# Patient Record
Sex: FEMALE | Race: WHITE | NOT HISPANIC OR LATINO | Employment: FULL TIME | URBAN - METROPOLITAN AREA
[De-identification: names, ages, dates, MRNs, and addresses within clinical notes are randomized per-mention and may not be internally consistent; named-entity substitution may affect disease eponyms.]

---

## 2017-03-01 ENCOUNTER — OFFICE VISIT (OUTPATIENT)
Dept: INTERNAL MEDICINE | Facility: CLINIC | Age: 22
End: 2017-03-01
Payer: COMMERCIAL

## 2017-03-01 VITALS
DIASTOLIC BLOOD PRESSURE: 62 MMHG | BODY MASS INDEX: 16.82 KG/M2 | HEART RATE: 82 BPM | OXYGEN SATURATION: 100 % | SYSTOLIC BLOOD PRESSURE: 124 MMHG | TEMPERATURE: 97.6 F | HEIGHT: 68 IN | WEIGHT: 111 LBS

## 2017-03-01 DIAGNOSIS — Z00.00 HEALTH CARE MAINTENANCE: Primary | ICD-10-CM

## 2017-03-01 DIAGNOSIS — Z23 NEED FOR TDAP VACCINATION: ICD-10-CM

## 2017-03-01 LAB
ERYTHROCYTE [DISTWIDTH] IN BLOOD BY AUTOMATED COUNT: 12.5 % (ref 10–15)
HCT VFR BLD AUTO: 39.6 % (ref 35–47)
HGB BLD-MCNC: 13.1 G/DL (ref 11.7–15.7)
MCH RBC QN AUTO: 29.4 PG (ref 26.5–33)
MCHC RBC AUTO-ENTMCNC: 33.1 G/DL (ref 31.5–36.5)
MCV RBC AUTO: 89 FL (ref 78–100)
PLATELET # BLD AUTO: 203 10E9/L (ref 150–450)
RBC # BLD AUTO: 4.45 10E12/L (ref 3.8–5.2)
WBC # BLD AUTO: 6.8 10E9/L (ref 4–11)

## 2017-03-01 PROCEDURE — 80048 BASIC METABOLIC PNL TOTAL CA: CPT | Performed by: NURSE PRACTITIONER

## 2017-03-01 PROCEDURE — 84443 ASSAY THYROID STIM HORMONE: CPT | Performed by: NURSE PRACTITIONER

## 2017-03-01 PROCEDURE — 87491 CHLMYD TRACH DNA AMP PROBE: CPT | Performed by: NURSE PRACTITIONER

## 2017-03-01 PROCEDURE — 90715 TDAP VACCINE 7 YRS/> IM: CPT | Performed by: NURSE PRACTITIONER

## 2017-03-01 PROCEDURE — 80061 LIPID PANEL: CPT | Performed by: NURSE PRACTITIONER

## 2017-03-01 PROCEDURE — 85027 COMPLETE CBC AUTOMATED: CPT | Performed by: NURSE PRACTITIONER

## 2017-03-01 PROCEDURE — 99385 PREV VISIT NEW AGE 18-39: CPT | Mod: 25 | Performed by: NURSE PRACTITIONER

## 2017-03-01 PROCEDURE — 87591 N.GONORRHOEAE DNA AMP PROB: CPT | Performed by: NURSE PRACTITIONER

## 2017-03-01 PROCEDURE — 90471 IMMUNIZATION ADMIN: CPT | Performed by: NURSE PRACTITIONER

## 2017-03-01 PROCEDURE — 36415 COLL VENOUS BLD VENIPUNCTURE: CPT | Performed by: NURSE PRACTITIONER

## 2017-03-01 NOTE — LETTER
[facility]  [address], [address]  [place], [place]  [address]                                            TEL:[phone]  FAX:[phone]      Julia Palencia  [address] [facility] [address]      March 2, 2017    To whom it may concern,    Ms. Julia Palencia presented on 3/1/17 for physical exam and medical clearance to participate in contact/tackle football. Her medical history is unremarkable with the exception of a concussion in 2006 which required overnight hospitalization.  There has not been any ongoing medical concerns.  Physical exam and lab work are normal.  There are no physical limitations for Julia.  Please contact me with any questions or concerns.      Sincerely,      Cori Hayes C.N.P.

## 2017-03-01 NOTE — NURSING NOTE
"Chief Complaint   Patient presents with     Physical     non fasting       Initial /62 (BP Location: Right arm, Patient Position: Chair, Cuff Size: Adult Regular)  Pulse 82  Temp 97.6  F (36.4  C) (Oral)  Ht 5' 7.5\" (1.715 m)  Wt 111 lb (50.3 kg)  LMP 02/12/2017 (Approximate)  SpO2 100%  Breastfeeding? No  BMI 17.13 kg/m2 Estimated body mass index is 17.13 kg/(m^2) as calculated from the following:    Height as of this encounter: 5' 7.5\" (1.715 m).    Weight as of this encounter: 111 lb (50.3 kg).  Medication Reconciliation: complete    "

## 2017-03-01 NOTE — PROGRESS NOTES
SUBJECTIVE:     CC: Julia Palencia is an 21 year old woman who presents for preventive health visit.     Healthy Habits:    Do you get at least three servings of calcium containing foods daily (dairy, green leafy vegetables, etc.)? yes    Amount of exercise or daily activities, outside of work: 3 day(s) per week    Problems taking medications regularly not applicable    Medication side effects: No    Have you had an eye exam in the past two years? Yes x 1 year ago    Do you see a dentist twice per year? Yes x 2 months ago    Do you have sleep apnea, excessive snoring or daytime drowsiness?no  See care everywhere          Today's PHQ-2 Score: No flowsheet data found.    Abuse: Current or Past(Physical, Sexual or Emotional)- No  Do you feel safe in your environment - Yes    Social History   Substance Use Topics     Smoking status: Not on file     Smokeless tobacco: Not on file     Alcohol use Not on file     The patient does not drink >3 drinks per day nor >7 drinks per week.    No results for input(s): CHOL, HDL, LDL, TRIG, CHOLHDLRATIO, NHDL in the last 54003 hours.    Reviewed orders with patient.  Reviewed health maintenance and updated orders accordingly - Yes    Mammo Decision Support:  Mammogram not appropriate for this patient based on age.    Pertinent mammograms are reviewed under the imaging tab.  History of abnormal Pap smear: NO - age 21-29 PAP every 3 years recommended    Reviewed and updated as needed this visit by clinical staff         Reviewed and updated as needed this visit by Provider            ROS:  C: NEGATIVE for fever, chills, change in weight  I: NEGATIVE for worrisome rashes, moles or lesions  E: NEGATIVE for vision changes or irritation  ENT: NEGATIVE for ear, mouth and throat problems  R: NEGATIVE for significant cough or SOB  B: NEGATIVE for masses, tenderness or discharge  CV: NEGATIVE for chest pain, palpitations or peripheral edema  GI: NEGATIVE for nausea, abdominal pain,  heartburn, or change in bowel habits  : NEGATIVE for unusual urinary or vaginal symptoms. Periods are regular.  M: NEGATIVE for significant arthralgias or myalgia  N: NEGATIVE for weakness, dizziness or paresthesias  P: NEGATIVE for changes in mood or affect      OBJECTIVE:     There were no vitals taken for this visit.  EXAM:  GENERAL: healthy, alert and no distress    ASSESSMENT/PLAN:         ICD-10-CM    1. Health care maintenance Z00.00 CBC with platelets     Basic metabolic panel     TSH with free T4 reflex     Lipid panel reflex to direct LDL     Chlamydia trachomatis PCR     Neisseria gonorrhoeae PCR   2. Need for Tdap vaccination Z23 TDAP (ADACEL AGES 11-64)       COUNSELING:   Reviewed preventive health counseling, as reflected in patient instructions         has no tobacco history on file.    There is no height or weight on file to calculate BMI.       Counseling Resources:  ATP IV Guidelines  Pooled Cohorts Equation Calculator  Breast Cancer Risk Calculator  FRAX Risk Assessment  ICSI Preventive Guidelines  Dietary Guidelines for Americans, 2010  USDA's MyPlate  ASA Prophylaxis  Lung CA Screening    Cori Hayes NP  [facility]

## 2017-03-01 NOTE — MR AVS SNAPSHOT
After Visit Summary   3/1/2017    Julia Palencia    MRN: [id number]           Patient Information     Date Of Birth          1995        Visit Information        Provider Department      3/1/2017 4:00 PM Cori Hayes NP [facility]        Today's Diagnoses     Health care maintenance    -  1    Need for Tdap vaccination          Care Instructions      Preventive Health Recommendations  Female Ages 18 to 25     Yearly exam:     See your health care provider every year in order to  o Review health changes.   o Discuss preventive care.    o Review your medicines if your doctor has prescribed any.      You should be tested each year for STDs (sexually transmitted diseases).       After age 20, talk to your provider about how often you should have cholesterol testing.      Starting at age 21, get a Pap test every three years. If you have an abnormal result, your doctor may have you test more often.      If you are at risk for diabetes, you should have a diabetes test (fasting glucose).     Shots:     Get a flu shot each year.     Get a tetanus shot every 10 years.     Consider getting the shot (vaccine) that prevents cervical cancer (Gardasil).    Nutrition:     Eat at least 5 servings of fruits and vegetables each day.    Eat whole-grain bread, whole-wheat pasta and brown rice instead of white grains and rice.    Talk to your provider about Calcium and Vitamin D.     Lifestyle    Exercise at least 150 minutes a week each week (30 minutes a day, 5 days a week). This will help you control your weight and prevent disease.    Limit alcohol to one drink per day.    No smoking.     Wear sunscreen to prevent skin cancer.    See your dentist every six months for an exam and cleaning.          Follow-ups after your visit        Who to contact     If you have questions or need follow up information about today's clinic visit or your schedule please contact [facility]  "directly at [phone].  Normal or non-critical lab and imaging results will be communicated to you by MyChart, letter or phone within 4 business days after the clinic has received the results. If you do not hear from us within 7 days, please contact the clinic through [employer] or phone. If you have a critical or abnormal lab result, we will notify you by phone as soon as possible.  Submit refill requests through [employer] or call your pharmacy and they will forward the refill request to us. Please allow 3 business days for your refill to be completed.          Additional Information About Your Visit        Webmedxhar[employer]ktone Information     [employer] lets you send messages to your doctor, view your test results, renew your prescriptions, schedule appointments and more. To sign up, go to [email or web address] . Click on \"Log in\" on the left side of the screen, which will take you to the Welcome page. Then click on \"Sign up Now\" on the right side of the page.     You will be asked to enter the access code listed below, as well as some personal information. Please follow the directions to create your username and password.     Your access code is: [email or web address]  Expires: 2017  1:46 PM     Your access code will  in 90 days. If you need help or a new code, please call your [place] clinic or [phone].        Care EveryWhere ID     This is your Care EveryWhere ID. This could be used by other organizations to access your [place] medical records  [id number]        Your Vitals Were     Pulse Temperature Height Last Period Pulse Oximetry Breastfeeding?    82 97.6  F (36.4  C) (Oral) 5' 7.5\" (1.715 m) 2017 (Approximate) 100% No    BMI (Body Mass Index)                   17.13 kg/m2            Blood Pressure from Last 3 Encounters:   17 124/62    Weight from Last 3 Encounters:   17 111 lb (50.3 kg)              We Performed the Following     Basic metabolic panel     CBC with platelets     Chlamydia " trachomatis PCR     Lipid panel reflex to direct LDL     Neisseria gonorrhoeae PCR     TDAP (ADACEL AGES 11-64)     TSH with free T4 reflex        Primary Care Provider Office Phone # Fax #    Cori Corona Freddy, DHRUV [phone]       [facility] [address] [facility] [address]        Equal Access to Services     RAY BANKS : Hadii aad ku hadasho Soomaali, waaxda luqadaha, qaybta kaalmada adeegyada, waxay idiin hayaan adeeg kharash la'aan . So [facility] [phone].    ATENCIÓN: Si habla español, tiene a lacy disposición servicios gratuitos de asistencia lingüística. Maria G al [phone].    We comply with applicable federal civil rights laws and [place] laws. We do not discriminate on the basis of race, color, national origin, age, disability sex, sexual orientation or gender identity.            Thank you!     Thank you for choosing [facility]  for your care. Our goal is always to provide you with excellent care. Hearing back from our patients is one way we can continue to improve our services. Please take a few minutes to complete the written survey that you may receive in the mail after your visit with us. Thank you!             Your Updated Medication List - Protect others around you: Learn how to safely use, store and throw away your medicines at www.disposemymeds.org.      Notice  As of 3/1/2017 11:59 PM    You have not been prescribed any medications.

## 2017-03-02 LAB
ANION GAP SERPL CALCULATED.3IONS-SCNC: 7 MMOL/L (ref 3–14)
BUN SERPL-MCNC: 11 MG/DL (ref 7–30)
CALCIUM SERPL-MCNC: 9 MG/DL (ref 8.5–10.1)
CHLORIDE SERPL-SCNC: 105 MMOL/L (ref 94–109)
CHOLEST SERPL-MCNC: 165 MG/DL
CO2 SERPL-SCNC: 27 MMOL/L (ref 20–32)
CREAT SERPL-MCNC: 0.67 MG/DL (ref 0.52–1.04)
GFR SERPL CREATININE-BSD FRML MDRD: NORMAL ML/MIN/1.7M2
GLUCOSE SERPL-MCNC: 90 MG/DL (ref 70–99)
HDLC SERPL-MCNC: 60 MG/DL
LDLC SERPL CALC-MCNC: 89 MG/DL
NONHDLC SERPL-MCNC: 105 MG/DL
POTASSIUM SERPL-SCNC: 4.4 MMOL/L (ref 3.4–5.3)
SODIUM SERPL-SCNC: 139 MMOL/L (ref 133–144)
TRIGL SERPL-MCNC: 82 MG/DL
TSH SERPL DL<=0.005 MIU/L-ACNC: 2.68 MU/L (ref 0.4–4)

## 2017-03-03 LAB
C TRACH DNA SPEC QL NAA+PROBE: NORMAL
N GONORRHOEA DNA SPEC QL NAA+PROBE: NORMAL
SPECIMEN SOURCE: NORMAL
SPECIMEN SOURCE: NORMAL

## 2017-06-24 ENCOUNTER — HEALTH MAINTENANCE LETTER (OUTPATIENT)
Age: 22
End: 2017-06-24

## 2017-12-19 ENCOUNTER — OFFICE VISIT (OUTPATIENT)
Dept: BEHAVIORAL HEALTH | Facility: CLINIC | Age: 22
End: 2017-12-19
Payer: COMMERCIAL

## 2017-12-19 ENCOUNTER — OFFICE VISIT (OUTPATIENT)
Dept: INTERNAL MEDICINE | Facility: CLINIC | Age: 22
End: 2017-12-19
Payer: COMMERCIAL

## 2017-12-19 VITALS
DIASTOLIC BLOOD PRESSURE: 90 MMHG | WEIGHT: 120.7 LBS | SYSTOLIC BLOOD PRESSURE: 118 MMHG | TEMPERATURE: 97.8 F | HEIGHT: 68 IN | BODY MASS INDEX: 18.29 KG/M2 | HEART RATE: 80 BPM | OXYGEN SATURATION: 100 %

## 2017-12-19 DIAGNOSIS — F32.1 MAJOR DEPRESSIVE DISORDER, SINGLE EPISODE, MODERATE (H): Primary | ICD-10-CM

## 2017-12-19 DIAGNOSIS — F41.1 GAD (GENERALIZED ANXIETY DISORDER): Primary | ICD-10-CM

## 2017-12-19 PROCEDURE — 99213 OFFICE O/P EST LOW 20 MIN: CPT | Performed by: NURSE PRACTITIONER

## 2017-12-19 PROCEDURE — 99207 ZZC NO CHARGE BEHAVIORAL WARM HANDOFF: CPT | Performed by: MARRIAGE & FAMILY THERAPIST

## 2017-12-19 ASSESSMENT — ANXIETY QUESTIONNAIRES
GAD7 TOTAL SCORE: 15
5. BEING SO RESTLESS THAT IT IS HARD TO SIT STILL: MORE THAN HALF THE DAYS
6. BECOMING EASILY ANNOYED OR IRRITABLE: SEVERAL DAYS
3. WORRYING TOO MUCH ABOUT DIFFERENT THINGS: NEARLY EVERY DAY
2. NOT BEING ABLE TO STOP OR CONTROL WORRYING: NEARLY EVERY DAY
7. FEELING AFRAID AS IF SOMETHING AWFUL MIGHT HAPPEN: SEVERAL DAYS
1. FEELING NERVOUS, ANXIOUS, OR ON EDGE: NEARLY EVERY DAY
IF YOU CHECKED OFF ANY PROBLEMS ON THIS QUESTIONNAIRE, HOW DIFFICULT HAVE THESE PROBLEMS MADE IT FOR YOU TO DO YOUR WORK, TAKE CARE OF THINGS AT HOME, OR GET ALONG WITH OTHER PEOPLE: SOMEWHAT DIFFICULT

## 2017-12-19 ASSESSMENT — PATIENT HEALTH QUESTIONNAIRE - PHQ9
SUM OF ALL RESPONSES TO PHQ QUESTIONS 1-9: 21
5. POOR APPETITE OR OVEREATING: MORE THAN HALF THE DAYS

## 2017-12-19 NOTE — MR AVS SNAPSHOT
"              After Visit Summary   12/19/2017    Julia Palencia    MRN: [id number]           Patient Information     Date Of Birth          1995        Visit Information        Provider Department      12/19/2017 9:40 AM Cori Hayes NP [facility] [address] [facility]        Today's Diagnoses     Major depressive disorder, single episode, moderate (H)    -  1       Follow-ups after your visit        Your next 10 appointments already scheduled     Dec 19, 2017 11:00 AM CST   New Visit with JOAN Chen   [facility] [address] ([facility])    [address] [facility] [address]  [facility] [id number] [phone]              Who to contact     If you have questions or need follow up information about today's clinic visit or your schedule please contact [facility] directly at [phone].  Normal or non-critical lab and imaging results will be communicated to you by MyChart, letter or phone within 4 business days after the clinic has received the results. If you do not hear from us within 7 days, please contact the clinic through MyChart or phone. If you have a critical or abnormal lab result, we will notify you by phone as soon as possible.  Submit refill requests through [employer] or call your pharmacy and they will forward the refill request to us. Please allow 3 business days for your refill to be completed.          Additional Information About Your Visit        MyChart Information     [employer] lets you send messages to your doctor, view your test results, renew your prescriptions, schedule appointments and more. To sign up, go to [email or web address] . Click on \"Log in\" on the left side of the screen, which will take you to the Welcome page. Then click on \"Sign up Now\" on the right side of the page.     You will be asked to enter the access code listed below, as well as some personal information. Please follow the directions to create your username and " "password.     Your access code is: [email or web address]  Expires: 3/19/2018 10:55 AM     Your access code will  in 90 days. If you need help or a new code, please call your [facility] or [phone].        Care EveryWhere ID     This is your Care EveryWhere ID. This could be used by other organizations to access your [place] medical records  [id number]        Your Vitals Were     Pulse Temperature Height Last Period Pulse Oximetry BMI (Body Mass Index)    80 97.8  F (36.6  C) (Oral) 5' 7.5\" (1.715 m) 2017 100% 18.63 kg/m2       Blood Pressure from Last 3 Encounters:   17 118/90   17 124/62    Weight from Last 3 Encounters:   17 120 lb 11.2 oz (54.7 kg)   17 111 lb (50.3 kg)              Today, you had the following     No orders found for display         Today's Medication Changes          These changes are accurate as of: 17 10:56 AM.  If you have any questions, ask your nurse or doctor.               Start taking these medicines.        Dose/Directions    sertraline 50 MG tablet   Commonly known as:  ZOLOFT   Used for:  Major depressive disorder, single episode, moderate (H)   Started by:  Cori Hayes NP        Dose:  50 mg   Take 1 tablet (50 mg) by mouth daily   Quantity:  30 tablet   Refills:  1            Where to get your medicines      These medications were sent to [place] [address] [place] [address] [address] [address]     Phone:  [phone]     sertraline 50 MG tablet                Primary Care Provider Office Phone # Fax #    Cori Hayes [phone]       [address] [facility] [address]        Equal Access to Services     RAY BANKS : Shabbir napoleso Solakisha, waaxda luqadaha, qaybta kaalmada adeegyada, zander laird. So [facility] [phone].    ATENCIÓN: Si habla español, tiene a lacy disposición servicios gratuitos de asistencia lingüística. " Maria G fisher [phone].    We comply with applicable federal civil rights laws and [place] laws. We do not discriminate on the basis of race, color, national origin, age, disability, sex, sexual orientation, or gender identity.            Thank you!     Thank you for choosing [facility]  for your care. Our goal is always to provide you with excellent care. Hearing back from our patients is one way we can continue to improve our services. Please take a few minutes to complete the written survey that you may receive in the mail after your visit with us. Thank you!             Your Updated Medication List - Protect others around you: Learn how to safely use, store and throw away your medicines at www.disposemymeds.org.          This list is accurate as of: 12/19/17 10:56 AM.  Always use your most recent med list.                   Brand Name Dispense Instructions for use Diagnosis    sertraline 50 MG tablet    ZOLOFT    30 tablet    Take 1 tablet (50 mg) by mouth daily    Major depressive disorder, single episode, moderate (H)

## 2017-12-19 NOTE — PROGRESS NOTES
Patient had appointment with his/her primary care physician. [facility] services were requested / offered. No immediate safety/risk issues were reported or identified.  Explained the role of the [facility] and provided informational handout and contact information for the [facility].     Chelsea Perkins, Behavioral Health Clinician  Declined Service at this time. Denies having SI and or plan.

## 2017-12-19 NOTE — PROGRESS NOTES
"  SUBJECTIVE:   Julia Palencia is a 22 year old female who presents to clinic today for the following health issues:      Abnormal Mood Symptoms      Duration: 2-3 months, worse past few weeks    Description:  Depression: no  Anxiety: no  Panic attacks: no     Accompanying signs and symptoms: see PHQ-9 and ADRI scores    History (similar episodes/previous evaluation): None    Precipitating or alleviating factors: broke off engagement 4 months ago.    Therapies tried and outcome: none          There is no problem list on file for this patient.    History reviewed. No pertinent surgical history.    Social History   Substance Use Topics     Smoking status: Never Smoker     Smokeless tobacco: Never Used     Alcohol use Yes      Comment: 1-2 per week     Family History   Problem Relation Age of Onset     DIABETES Mother      GASTROINTESTINAL DISEASE Father      Depression Brother          Current Outpatient Prescriptions   Medication Sig Dispense Refill     sertraline (ZOLOFT) 50 MG tablet Take 1 tablet (50 mg) by mouth daily 30 tablet 1     BP Readings from Last 3 Encounters:   12/19/17 118/90   03/01/17 124/62    Wt Readings from Last 3 Encounters:   12/19/17 120 lb 11.2 oz (54.7 kg)   03/01/17 111 lb (50.3 kg)                        Reviewed and updated as needed this visit by clinical staffTobacco  Allergies  Meds  Problems  Med Hx  Surg Hx  Fam Hx  Soc Hx        Reviewed and updated as needed this visit by Provider         ROS:  Constitutional, HEENT, cardiovascular, pulmonary, gi and gu systems are negative, except as otherwise noted.      OBJECTIVE:   /90 (BP Location: Right arm, Patient Position: Sitting, Cuff Size: Adult Regular)  Pulse 80  Temp 97.8  F (36.6  C) (Oral)  Ht 5' 7.5\" (1.715 m)  Wt 120 lb 11.2 oz (54.7 kg)  [facility] 12/18/2017  SpO2 100%  BMI 18.63 kg/m2  Body mass index is 18.63 kg/(m^2).  GENERAL: slender female, old excoriation scarring on forearms  PSYCH: mentation appears normal, " affect flat, anxious, appearance disheveled, fidgety, unable to sit still, constant hand or leg movement        ASSESSMENT/PLAN:               ICD-10-CM    1. Major depressive disorder, single episode, moderate (H) F32.1 sertraline (ZOLOFT) 50 MG tablet       F/u 2-3 weeks  Hand off to Chelsea TROY for counseling    A total of 15 minutes was spent with the patient today, with greater than 50% of the visit involving counseling and coordination of care.      Cori Hayes NP  [facility]

## 2017-12-19 NOTE — NURSING NOTE
"Chief Complaint   Patient presents with     Depression     Contraception       Initial /90 (BP Location: Right arm, Patient Position: Sitting, Cuff Size: Adult Regular)  Pulse 80  Temp 97.8  F (36.6  C) (Oral)  Ht 5' 7.5\" (1.715 m)  Wt 120 lb 11.2 oz (54.7 kg)  LMP 12/18/2017  SpO2 100%  BMI 18.63 kg/m2 Estimated body mass index is 18.63 kg/(m^2) as calculated from the following:    Height as of this encounter: 5' 7.5\" (1.715 m).    Weight as of this encounter: 120 lb 11.2 oz (54.7 kg).  Medication Reconciliation: complete    "

## 2017-12-19 NOTE — MR AVS SNAPSHOT
"              After Visit Summary   12/19/2017    Julia Palencia    MRN: [id number]           Patient Information     Date Of Birth          1995        Visit Information        Provider Department      12/19/2017 11:00 AM Chelsea Perkins LMFT [facility]        Today's Diagnoses     ADRI (generalized anxiety disorder)    -  1       Follow-ups after your visit        Your next 10 appointments already scheduled     Dec 19, 2017 11:00 AM CST   New Visit with JOAN Chen   [facility] ([facility]    [address] [facility] [address]  [facility] [id number] [phone]              Who to contact     If you have questions or need follow up information about today's clinic visit or your schedule please contact [facility] directly at [phone].  Normal or non-critical lab and imaging results will be communicated to you by MyChart, letter or phone within 4 business days after the clinic has received the results. If you do not hear from us within 7 days, please contact the clinic through MyChart or phone. If you have a critical or abnormal lab result, we will notify you by phone as soon as possible.  Submit refill requests through [employer] or call your pharmacy and they will forward the refill request to us. Please allow 3 business days for your refill to be completed.          Additional Information About Your Visit        MyChart Information     [employer] lets you send messages to your doctor, view your test results, renew your prescriptions, schedule appointments and more. To sign up, go to [email or web address] . Click on \"Log in\" on the left side of the screen, which will take you to the Welcome page. Then click on \"Sign up Now\" on the right side of the page.     You will be asked to enter the access code listed below, as well as some personal information. Please follow the directions to create your username and password.     Your " access code is: [email or web address]  Expires: 3/19/2018 10:55 AM     Your access code will  in 90 days. If you need help or a new code, please call your [place] clinic or [phone].        Care EveryWhere ID     This is your Care EveryWhere ID. This could be used by other organizations to access your [place] medical records  [id number]        Your Vitals Were     Last Period                   2017            Blood Pressure from Last 3 Encounters:   17 118/90   17 124/62    Weight from Last 3 Encounters:   17 54.7 kg (120 lb 11.2 oz)   17 50.3 kg (111 lb)              Today, you had the following     No orders found for display         Today's Medication Changes          These changes are accurate as of: 17 10:55 AM.  If you have any questions, ask your nurse or doctor.               Start taking these medicines.        Dose/Directions    sertraline 50 MG tablet   Commonly known as:  ZOLOFT   Used for:  Major depressive disorder, single episode, moderate (H)   Started by:  Cori Hayes NP        Dose:  50 mg   Take 1 tablet (50 mg) by mouth daily   Quantity:  30 tablet   Refills:  1            Where to get your medicines      These medications were sent to [place] [address] [address] [id number]     Phone:  [phone]     sertraline 50 MG tablet                Primary Care Provider Office Phone # Fax #    Cori Hayes [phone]       [facility] [address] [place] [address]        Equal Access to Services     [facility]: Hadii aad ku hadasho Soomaali, waaxda luqadaha, qaybta kaalmada adeegyada, zander laird. So [facility] [phone].    ATENCIÓN: Si habla español, tiene a lacy disposición servicios gratuitos de asistencia lingüística. Llame al [phone].    We comply with applicable federal civil rights laws and [place] laws. We do not discriminate on the  basis of race, color, national origin, age, disability, sex, sexual orientation, or gender identity.            Thank you!     Thank you for choosing [facility]  for your care. Our goal is always to provide you with excellent care. Hearing back from our patients is one way we can continue to improve our services. Please take a few minutes to complete the written survey that you may receive in the mail after your visit with us. Thank you!             Your Updated Medication List - Protect others around you: Learn how to safely use, store and throw away your medicines at www.disposemymeds.org.          This list is accurate as of: 12/19/17 10:55 AM.  Always use your most recent med list.                   Brand Name Dispense Instructions for use Diagnosis    sertraline 50 MG tablet    ZOLOFT    30 tablet    Take 1 tablet (50 mg) by mouth daily    Major depressive disorder, single episode, moderate (H)

## 2017-12-20 ASSESSMENT — ANXIETY QUESTIONNAIRES: GAD7 TOTAL SCORE: 15

## 2018-01-17 ENCOUNTER — OFFICE VISIT (OUTPATIENT)
Dept: INTERNAL MEDICINE | Facility: CLINIC | Age: 23
End: 2018-01-17
Payer: COMMERCIAL

## 2018-01-17 VITALS
HEART RATE: 120 BPM | HEIGHT: 68 IN | WEIGHT: 115.4 LBS | DIASTOLIC BLOOD PRESSURE: 70 MMHG | BODY MASS INDEX: 17.49 KG/M2 | TEMPERATURE: 97.7 F | SYSTOLIC BLOOD PRESSURE: 110 MMHG | RESPIRATION RATE: 16 BRPM | OXYGEN SATURATION: 99 %

## 2018-01-17 DIAGNOSIS — F32.0 MILD SINGLE CURRENT EPISODE OF MAJOR DEPRESSIVE DISORDER (H): ICD-10-CM

## 2018-01-17 DIAGNOSIS — F41.1 GAD (GENERALIZED ANXIETY DISORDER): Primary | ICD-10-CM

## 2018-01-17 DIAGNOSIS — Z23 NEED FOR PROPHYLACTIC VACCINATION AND INOCULATION AGAINST INFLUENZA: ICD-10-CM

## 2018-01-17 PROCEDURE — 99213 OFFICE O/P EST LOW 20 MIN: CPT | Performed by: NURSE PRACTITIONER

## 2018-01-17 PROCEDURE — 90471 IMMUNIZATION ADMIN: CPT | Performed by: NURSE PRACTITIONER

## 2018-01-17 PROCEDURE — 90686 IIV4 VACC NO PRSV 0.5 ML IM: CPT | Performed by: NURSE PRACTITIONER

## 2018-01-17 RX ORDER — SERTRALINE HYDROCHLORIDE 100 MG/1
100 TABLET, FILM COATED ORAL DAILY
Qty: 90 TABLET | Refills: 1 | Status: SHIPPED | OUTPATIENT
Start: 2018-01-17 | End: 2018-06-26

## 2018-01-17 ASSESSMENT — ANXIETY QUESTIONNAIRES
7. FEELING AFRAID AS IF SOMETHING AWFUL MIGHT HAPPEN: NOT AT ALL
2. NOT BEING ABLE TO STOP OR CONTROL WORRYING: MORE THAN HALF THE DAYS
5. BEING SO RESTLESS THAT IT IS HARD TO SIT STILL: SEVERAL DAYS
3. WORRYING TOO MUCH ABOUT DIFFERENT THINGS: MORE THAN HALF THE DAYS
GAD7 TOTAL SCORE: 10
6. BECOMING EASILY ANNOYED OR IRRITABLE: NOT AT ALL
IF YOU CHECKED OFF ANY PROBLEMS ON THIS QUESTIONNAIRE, HOW DIFFICULT HAVE THESE PROBLEMS MADE IT FOR YOU TO DO YOUR WORK, TAKE CARE OF THINGS AT HOME, OR GET ALONG WITH OTHER PEOPLE: SOMEWHAT DIFFICULT
1. FEELING NERVOUS, ANXIOUS, OR ON EDGE: NEARLY EVERY DAY

## 2018-01-17 ASSESSMENT — PATIENT HEALTH QUESTIONNAIRE - PHQ9
5. POOR APPETITE OR OVEREATING: MORE THAN HALF THE DAYS
SUM OF ALL RESPONSES TO PHQ QUESTIONS 1-9: 19

## 2018-01-17 NOTE — NURSING NOTE
"Chief Complaint   Patient presents with     Recheck Medication     zoloft       Initial /70 (BP Location: Right arm, Patient Position: Sitting, Cuff Size: Adult Regular)  Pulse 120  Temp 97.7  F (36.5  C) (Oral)  Resp 16  Ht 5' 7.5\" (1.715 m)  Wt 115 lb 6.4 oz (52.3 kg)  LMP 01/08/2018 (Exact Date)  SpO2 99%  BMI 17.81 kg/m2 Estimated body mass index is 17.81 kg/(m^2) as calculated from the following:    Height as of this encounter: 5' 7.5\" (1.715 m).    Weight as of this encounter: 115 lb 6.4 oz (52.3 kg).  Medication Reconciliation: complete    "

## 2018-01-17 NOTE — PROGRESS NOTES
SUBJECTIVE:   Julia Palencia is a 22 year old female who presents to clinic today for the following health issues:      Anxiety Follow-Up    Status since last visit: Worsened , had panic attack and went to [facility] 1/11/18    Other associated symptoms:None    Complicating factors:   Significant life event: No   Current substance abuse: no  Depression symptoms: Yes-    ADRI-7 SCORE 12/19/2017   Total Score 15       GAD7              Amount of exercise or physical activity: 2-3 days/week for an average of greater than 60 minutes    Problems taking medications regularly: No    Medication side effects: none    Diet: regular (no restrictions)    Patient Active Problem List   Diagnosis     ADRI (generalized anxiety disorder)     Mild single current episode of major depressive disorder (H)     History reviewed. No pertinent surgical history.    Social History   Substance Use Topics     Smoking status: Never Smoker     Smokeless tobacco: Never Used     Alcohol use Yes      Comment: 1-2 per week     Family History   Problem Relation Age of Onset     DIABETES Mother      GASTROINTESTINAL DISEASE Father      Depression Brother          Current Outpatient Prescriptions   Medication Sig Dispense Refill     sertraline (ZOLOFT) 100 MG tablet Take 1 tablet (100 mg) by mouth daily 90 tablet 1     [DISCONTINUED] sertraline (ZOLOFT) 50 MG tablet Take 1 tablet (50 mg) by mouth daily 30 tablet 1     BP Readings from Last 3 Encounters:   01/17/18 110/70   12/19/17 118/90   03/01/17 124/62    Wt Readings from Last 3 Encounters:   01/17/18 115 lb 6.4 oz (52.3 kg)   12/19/17 120 lb 11.2 oz (54.7 kg)   03/01/17 111 lb (50.3 kg)                        Reviewed and updated as needed this visit by clinical staffTobacco  Allergies  Meds  Med Hx  Surg Hx  Fam Hx  Soc Hx      Reviewed and updated as needed this visit by Provider         ROS:  Constitutional, HEENT, cardiovascular, pulmonary, gi and gu systems are negative, except as otherwise  "noted.      OBJECTIVE:   /70 (BP Location: Right arm, Patient Position: Sitting, Cuff Size: Adult Regular)  Pulse 120  Temp 97.7  F (36.5  C) (Oral)  Resp 16  Ht 5' 7.5\" (1.715 m)  Wt 115 lb 6.4 oz (52.3 kg)  LMP 01/08/2018 (Exact Date)  SpO2 99%  BMI 17.81 kg/m2  Body mass index is 17.81 kg/(m^2).  GENERAL: healthy, alert and no distress  PSYCH: mentation appears normal, affect flat and anxious        ASSESSMENT/PLAN:               ICD-10-CM    1. ADRI (generalized anxiety disorder) F41.1 sertraline (ZOLOFT) 100 MG tablet   2. Mild single current episode of major depressive disorder (H) F32.0 sertraline (ZOLOFT) 100 MG tablet       Increase zoloft  F/u 1 month  Encouraged counseling with beatrice Tran.    A total of 15 minutes was spent with the patient today, with greater than 50% of the visit involving counseling and coordination of care.      Cori Hayes, DHRUV  [facility]    "

## 2018-01-17 NOTE — MR AVS SNAPSHOT
"              After Visit Summary   1/17/2018    Julia Palencia    MRN: [id number]           Patient Information     Date Of Birth          1995        Visit Information        Provider Department      1/17/2018 7:40 AM Cori Hayes NP [facility]        Today's Diagnoses     ADRI (generalized anxiety disorder)    -  1    Mild single current episode of major depressive disorder (H)           Follow-ups after your visit        Your next 10 appointments already scheduled     Jan 18, 2018  9:00 AM CST   Office Visit with SASHA Martinez CNM   [facility] ([facility]    [address]  [facility] [address]   [phone]           Bring a current list of meds and any records pertaining to this visit. For Physicals, please bring immunization records and any forms needing to be filled out. Please arrive 10 minutes early to complete paperwork.              Who to contact     If you have questions or need follow up information about today's clinic visit or your schedule please contact [facility] directly at [phone].  Normal or non-critical lab and imaging results will be communicated to you by MyChart, letter or phone within 4 business days after the clinic has received the results. If you do not hear from us within 7 days, please contact the clinic through [employer] or phone. If you have a critical or abnormal lab result, we will notify you by phone as soon as possible.  Submit refill requests through [employer] or call your pharmacy and they will forward the refill request to us. Please allow 3 business days for your refill to be completed.          Additional Information About Your Visit        MyChart Information     [employer] lets you send messages to your doctor, view your test results, renew your prescriptions, schedule appointments and more. To sign up, go to [email or web address] . Click on \"Log in\" on the left side " "of the screen, which will take you to the Welcome page. Then click on \"Sign up Now\" on the right side of the page.     You will be asked to enter the access code listed below, as well as some personal information. Please follow the directions to create your username and password.     Your access code is: [email or web address]  Expires: 3/19/2018 10:55 AM     Your access code will  in 90 days. If you need help or a new code, please call your [facility] clinic or [phone].        Care EveryWhere ID     This is your Care EveryWhere ID. This could be used by other organizations to access your [facility] medical records  [id number]        Your Vitals Were     Pulse Temperature Respirations Height Last Period Pulse Oximetry    120 97.7  F (36.5  C) (Oral) 16 5' 7.5\" (1.715 m) 2018 (Exact Date) 99%    BMI (Body Mass Index)                   17.81 kg/m2            Blood Pressure from Last 3 Encounters:   18 110/70   17 118/90   17 124/62    Weight from Last 3 Encounters:   18 115 lb 6.4 oz (52.3 kg)   17 120 lb 11.2 oz (54.7 kg)   17 111 lb (50.3 kg)              Today, you had the following     No orders found for display         Today's Medication Changes          These changes are accurate as of: 18  8:15 AM.  If you have any questions, ask your nurse or doctor.               These medicines have changed or have updated prescriptions.        Dose/Directions    sertraline 100 MG tablet   Commonly known as:  ZOLOFT   This may have changed:    - medication strength  - how much to take   Used for:  ADRI (generalized anxiety disorder), Mild single current episode of major depressive disorder (H)   Changed by:  Cori Hayes NP        Dose:  100 mg   Take 1 tablet (100 mg) by mouth daily   Quantity:  90 tablet   Refills:  1            Where to get your medicines      These medications were sent to [facility]  [place], [place] - [address] AT [facility]  [address] [place] [address] [address]     Phone:  [phone]     sertraline 100 MG tablet                Primary Care Provider Office Phone # Fax #    Cori Corona DHRUV Hayes [phone]       [address]  [facility] [address]        Equal Access to Services     [facility]: Hadii aad ku hadasho Soomaali, waaxda luqadaha, qaybta kaalmada adeegyada, waxay idiin hayaan adeeg kharash la'aan . So [facility] [phone].    ATENCIÓN: Si habla español, tiene a lacy disposición servicios gratuitos de asistencia lingüística. Wilverame al [phone].    We comply with applicable federal civil rights laws and [place] laws. We do not discriminate on the basis of race, color, national origin, age, disability, sex, sexual orientation, or gender identity.            Thank you!     Thank you for choosing [facility]  for your care. Our goal is always to provide you with excellent care. Hearing back from our patients is one way we can continue to improve our services. Please take a few minutes to complete the written survey that you may receive in the mail after your visit with us. Thank you!             Your Updated Medication List - Protect others around you: Learn how to safely use, store and throw away your medicines at www.disposemymeds.org.          This list is accurate as of: 1/17/18  8:15 AM.  Always use your most recent med list.                   Brand Name Dispense Instructions for use Diagnosis    sertraline 100 MG tablet    ZOLOFT    90 tablet    Take 1 tablet (100 mg) by mouth daily    ADRI (generalized anxiety disorder), Mild single current episode of major depressive disorder (H)

## 2018-01-17 NOTE — PROGRESS NOTES

## 2018-01-18 ENCOUNTER — OFFICE VISIT (OUTPATIENT)
Dept: OBGYN | Facility: CLINIC | Age: 23
End: 2018-01-18
Payer: COMMERCIAL

## 2018-01-18 VITALS — BODY MASS INDEX: 18.39 KG/M2 | DIASTOLIC BLOOD PRESSURE: 70 MMHG | SYSTOLIC BLOOD PRESSURE: 98 MMHG | WEIGHT: 119.2 LBS

## 2018-01-18 DIAGNOSIS — Z11.3 ROUTINE SCREENING FOR STI (SEXUALLY TRANSMITTED INFECTION): ICD-10-CM

## 2018-01-18 DIAGNOSIS — Z30.09 BIRTH CONTROL COUNSELING: Primary | ICD-10-CM

## 2018-01-18 PROCEDURE — 87591 N.GONORRHOEAE DNA AMP PROB: CPT | Performed by: ADVANCED PRACTICE MIDWIFE

## 2018-01-18 PROCEDURE — 99203 OFFICE O/P NEW LOW 30 MIN: CPT | Performed by: ADVANCED PRACTICE MIDWIFE

## 2018-01-18 PROCEDURE — 87491 CHLMYD TRACH DNA AMP PROBE: CPT | Performed by: ADVANCED PRACTICE MIDWIFE

## 2018-01-18 NOTE — PROGRESS NOTES
SUBJECTIVE:  Julia Palencia is a 22 year old woman here for contraceptive management.  HPI: Would like to discuss getting Nexplanon.  Has researched other birth control methods and is most interested in Nexplanon.   MENSTRUAL HISTORY  ==================  No obstetric history on file.  Patient's last menstrual period was 01/08/2018 (exact date).    Periods are regular q 28-30 days, 1-2 days HMB, lasting 6 days  Dysmenorrhea none. Cyclic symptoms include  NONE. Additional symptoms include NONE    SEXUAL HISTORY  ==============  Current contraception: condoms  ACHES reviewed and WNL: yes  Number of partners in last year: 4  Pain with intercourse:No  Bleeding with intercourse:No  History of STD/ Vaginal infections:No STD history  Urinary symptoms:no    HISTORIES  =========  Medical, surgical, and family histories as well as medications and allergies reviewed and updated in this encounter.    REVIEW OF SYSTEMS  ==================  C: NEGATIVE for fever, chills  E: NEGATIVE for vision changes   R: NEGATIVE for significant cough or SOB  CV: NEGATIVE for chest pain, palpitations   GI: NEGATIVE for nausea, abdominal pain, heartburn, or change in bowel habits  : NEGATIVE for frequency, dysuria, or hematuria  M: NEGATIVE for significant arthralgias or myalgia  N: NEGATIVE for weakness, dizziness or paresthesias or headache    EXAM  =========  BP 98/70  Wt 119 lb 3.2 oz (54.1 kg)  LMP 01/08/2018 (Exact Date)  BMI 18.39 kg/m2    GENERAL APPEARANCE: healthy, alert and no distress  RESP: lungs clear to auscultation - no rales, rhonchi or wheezes  CV: regular rates and rhythm  PSYCH: mentation appears normal and affect normal/bright  MENTAL STATUS EXAM:  Appearance/Behavior: No apparent distress  Speech: Normal  Mood/Affect: normal affect  Insight: Adequate    WET PREP:Not done  STI: screening accepted     ASSESSMENT/PLAN  ======  (Z30.09) Birth control counseling  (primary encounter diagnosis)  Plan: Reviewed risks and  benefits.  Patient is a good candidate for Nexplanon.  Has had intercourse in last 2 weeks.  Encouraged to return to clinic during first 7 days of next menses, if not refrain from intercourse 2 weeks prior to procedure. Patient verbalized understanding.   -Nexplanon handout given     (Z11.3) Routine screening for STI (sexually transmitted infection)  Comment: done  Plan: Chlamydia trachomatis PCR, Neisseria         gonorrhoeae PCR        Results pending     -Due for pap, patient deferred this visit.  Would like to return to clinic for Pap.       PATIENT EDUCATION  =================  1.  Discussed with patient risks/ benefits and treatment options of prescribed medications or other treatment modalities.  2.  Discussed STD/ safe sex precautions.  3.  RTC first 7 days of next menses for Nexplanon placement  4. return to clinic one year for annual exam or with concerns.    15 minutes was spent face to face with the patient today discussing her history, diagnosis, and follow-up plan as noted above. Over 50% of the visit was spent in counseling and coordination of care.    SASHA Hill, JASIELM

## 2018-01-18 NOTE — MR AVS SNAPSHOT
"              After Visit Summary   2018    Julia Palencia    MRN: [id number]           Patient Information     Date Of Birth          1995        Visit Information        Provider Department      2018 9:00 AM Leda Hayes APRN CNM [facility]        Today's Diagnoses     Birth control counseling    -  1    Routine screening for STI (sexually transmitted infection)           Follow-ups after your visit        Follow-up notes from your care team     Return in about 4 weeks (around 2/15/2018) for Nexplanon .      Who to contact     If you have questions or need follow up information about today's clinic visit or your schedule please contact [facility] directly at [phone].  Normal or non-critical lab and imaging results will be communicated to you by MyChart, letter or phone within 4 business days after the clinic has received the results. If you do not hear from us within 7 days, please contact the clinic through [employer] or phone. If you have a critical or abnormal lab result, we will notify you by phone as soon as possible.  Submit refill requests through [employer] or call your pharmacy and they will forward the refill request to us. Please allow 3 business days for your refill to be completed.          Additional Information About Your Visit        MyChart Information     [employer] lets you send messages to your doctor, view your test results, renew your prescriptions, schedule appointments and more. To sign up, go to [email or web address] . Click on \"Log in\" on the left side of the screen, which will take you to the Welcome page. Then click on \"Sign up Now\" on the right side of the page.     You will be asked to enter the access code listed below, as well as some personal information. Please follow the directions to create your username and password.     Your access code is: [email or web address]  Expires: 3/19/2018 10:55 AM     Your access code will  in 90 " days. If you need help or a new code, please call your [place] clinic or [phone].        Care EveryWhere ID     This is your Care EveryWhere ID. This could be used by other organizations to access your [place] medical records  [id number]        Your Vitals Were     Last Period BMI (Body Mass Index)                01/08/2018 (Exact Date) 18.39 kg/m2           Blood Pressure from Last 3 Encounters:   01/18/18 98/70   01/17/18 110/70   12/19/17 118/90    Weight from Last 3 Encounters:   01/18/18 119 lb 3.2 oz (54.1 kg)   01/17/18 115 lb 6.4 oz (52.3 kg)   12/19/17 120 lb 11.2 oz (54.7 kg)              We Performed the Following     Chlamydia trachomatis PCR     Neisseria gonorrhoeae PCR        Primary Care Provider Office Phone # Fax #    Cori Cj Hayes, DHRUV [phone]       [address] [facility] [address]        Equal Access to Services     [facility] : Hadii aad ku hadasho Soomaali, waaxda luqadaha, qaybta kaalmada adeegyada, waxay idiin haynessan maddy khalil . So [facility] [phone].    ATENCIÓN: Si habla español, tiene a lacy disposición servicios gratuitos de asistencia lingüística. Llame al [phone].    We comply with applicable federal civil rights laws and [place] laws. We do not discriminate on the basis of race, color, national origin, age, disability, sex, sexual orientation, or gender identity.            Thank you!     Thank you for choosing [facility]  for your care. Our goal is always to provide you with excellent care. Hearing back from our patients is one way we can continue to improve our services. Please take a few minutes to complete the written survey that you may receive in the mail after your visit with us. Thank you!             Your Updated Medication List - Protect others around you: Learn how to safely use, store and throw away your medicines at www.disposemymeds.org.          This list is accurate as of: 1/18/18  9:36 AM.   Always use your most recent med list.                   Brand Name Dispense Instructions for use Diagnosis    sertraline 100 MG tablet    ZOLOFT    90 tablet    Take 1 tablet (100 mg) by mouth daily    ADRI (generalized anxiety disorder), Mild single current episode of major depressive disorder (H)

## 2018-01-18 NOTE — NURSING NOTE
"Chief Complaint   Patient presents with     Contraception     Discuss options       Initial BP 98/70  Wt 119 lb 3.2 oz (54.1 kg)  LMP 01/08/2018 (Exact Date)  BMI 18.39 kg/m2 Estimated body mass index is 18.39 kg/(m^2) as calculated from the following:    Height as of 1/17/18: 5' 7.5\" (1.715 m).    Weight as of this encounter: 119 lb 3.2 oz (54.1 kg).  Medication Reconciliation: complete     Sravanthi Cedeño, MARBELLA      "

## 2018-01-19 LAB
C TRACH DNA SPEC QL NAA+PROBE: NEGATIVE
N GONORRHOEA DNA SPEC QL NAA+PROBE: NEGATIVE
SPECIMEN SOURCE: NORMAL
SPECIMEN SOURCE: NORMAL

## 2018-01-19 ASSESSMENT — ANXIETY QUESTIONNAIRES: GAD7 TOTAL SCORE: 10

## 2018-02-15 ENCOUNTER — OFFICE VISIT (OUTPATIENT)
Dept: OBGYN | Facility: CLINIC | Age: 23
End: 2018-02-15
Payer: COMMERCIAL

## 2018-02-15 VITALS
HEIGHT: 68 IN | DIASTOLIC BLOOD PRESSURE: 80 MMHG | BODY MASS INDEX: 18.04 KG/M2 | WEIGHT: 119 LBS | SYSTOLIC BLOOD PRESSURE: 118 MMHG

## 2018-02-15 DIAGNOSIS — Z30.017 NEXPLANON INSERTION: Primary | ICD-10-CM

## 2018-02-15 DIAGNOSIS — Z01.812 PRE-PROCEDURE LAB EXAM: ICD-10-CM

## 2018-02-15 PROCEDURE — 11981 INSERTION DRUG DLVR IMPLANT: CPT | Performed by: ADVANCED PRACTICE MIDWIFE

## 2018-02-15 NOTE — PROGRESS NOTES
NEXPLANON INSERTION PROCEDURE    Julia Palencia is a 22 year old No obstetric history on file. who presents for Nexplanon insertion. The  patient's last menstrual period was 2/7/2018.  The patient is currently using condoms  for contraception.     Tests:  No results found for this or any previous visit (from the past 24 hour(s)).  Negative UPT     A complete discussion of the risks and benefits of nexplanon use and the details of the insertion procedure was held with the patient.  All questions were answered.  A consent form was signed.      Prior to the beginning of the procedure the team paused to verify the patient's identity, as well as the procedure to be performed and the correct side/site.  All equipment required was ready and available. The patient was positioned appropriately.     Preprocedure medications: 1% plain lidocaine, 1-2 ml  Nexplanon Lot # [id number]        NEXPLANON INSERTION:   Patients allergies were confirmed.  The patient was placed in the supine position with her left (non-dominant) arm flexed at the elbow, externally rotated, and placed with her wrist parallel to her ear.  With the shield on, the kunal was visible inside the needle tip.  The needle shield was removed.  Counter-traction was applied to the skin at the previous insertion/removal site.  The tip of the needle was inserted at the site at a slight angle.  The applicator was then lowered to a horizontal position.  The needle was inserted to its full length, keeping the needle parallel to the surface of the skin and the skin tented. The applicator button was pressed and the the needle retracted within the device leaving the Nexplanon kunal under the skin.  The 4 cm kunal was palpated under the skin.  The patient also palpated the kunal.The skin was closed with steristrip. A pressure bandage was applied with sterile gauze. The patient was instructed to remove the bangage in several hours and replace with a band-aid.    The user card was filled  out and given to the patient to keep.  The Patient Chart Label was completed and sent for scanning.    PLAN:   The patient was asked to contact the clinic for any fever/chills/severe pelvic or abdominal pain or heavy bleeding.     FOLLOW-UP:  She was asked to follow up for any problems.   Well woman exam for Pap, due, deferred this visit     Leda Hayes CNM

## 2018-02-15 NOTE — NURSING NOTE
"Chief Complaint   Patient presents with     Contraception     Nexplanon  insertion       Initial /80 (BP Location: Left arm, Patient Position: Chair, Cuff Size: Adult Regular)  Ht 5' 7.5\" (1.715 m)  Wt 119 lb (54 kg)  LMP 02/07/2018 (Exact Date)  BMI 18.36 kg/m2 Estimated body mass index is 18.36 kg/(m^2) as calculated from the following:    Height as of this encounter: 5' 7.5\" (1.715 m).    Weight as of this encounter: 119 lb (54 kg).  BP completed using cuff size: regular    No obstetric history on file.    The following HM Due: pap smear      Will schedule a wellness exam      Zo Sweeney CMA    "

## 2018-02-15 NOTE — MR AVS SNAPSHOT
"              After Visit Summary   2/15/2018    Julia Palencia    MRN: [id number]           Patient Information     Date Of Birth          1995        Visit Information        Provider Department      2/15/2018 4:00 PM Leda Hayes APRN CNM [facility]        Today's Diagnoses     Nexplanon insertion    -  1    Pre-procedure lab exam           Follow-ups after your visit        Follow-up notes from your care team     Return if symptoms worsen or fail to improve, for Physical Exam.      Who to contact     If you have questions or need follow up information about today's clinic visit or your schedule please contact [facility] directly at [phone].  Normal or non-critical lab and imaging results will be communicated to you by MyChart, letter or phone within 4 business days after the clinic has received the results. If you do not hear from us within 7 days, please contact the clinic through [email or web address] or phone. If you have a critical or abnormal lab result, we will notify you by phone as soon as possible.  Submit refill requests through Double Fusion or call your pharmacy and they will forward the refill request to us. Please allow 3 business days for your refill to be completed.          Additional Information About Your Visit        MyChart Information     Double Fusion lets you send messages to your doctor, view your test results, renew your prescriptions, schedule appointments and more. To sign up, go to [email or web address] . Click on \"Log in\" on the left side of the screen, which will take you to the Welcome page. Then click on \"Sign up Now\" on the right side of the page.     You will be asked to enter the access code listed below, as well as some personal information. Please follow the directions to create your username and password.     Your access code is: [email or web address]  Expires: 3/19/2018 10:55 AM     Your access code will  in 90 days. If you need help or a new code, " "please call your [place] clinic or [phone].        Care EveryWhere ID     This is your Care EveryWhere ID. This could be used by other organizations to access your [place] medical records  [id number]        Your Vitals Were     Height Last Period BMI (Body Mass Index)             5' 7.5\" (1.715 m) 02/07/2018 (Exact Date) 18.36 kg/m2          Blood Pressure from Last 3 Encounters:   02/15/18 118/80   01/18/18 98/70   01/17/18 110/70    Weight from Last 3 Encounters:   02/15/18 119 lb (54 kg)   01/18/18 119 lb 3.2 oz (54.1 kg)   01/17/18 115 lb 6.4 oz (52.3 kg)              We Performed the Following     ETONOGESTREL IMPLANT SYSTEM     HCL HCG, URINE, NURSE BACKOFFICE     INSERTION NON-BIODEGRADABLE DRUG DELIVERY IMPLANT          Today's Medication Changes          These changes are accurate as of 2/15/18  4:29 PM.  If you have any questions, ask your nurse or doctor.               Start taking these medicines.        Dose/Directions    etonogestrel 68 MG Impl   Commonly known as:  IMPLANON/NEXPLANON   Used for:  Nexplanon insertion   Started by:  Leda Hayes APRN CNM        Dose:  1 each   1 each (68 mg) by Subdermal route continuous   Refills:  0            Where to get your medicines      Some of these will need a paper prescription and others can be bought over the counter.  Ask your nurse if you have questions.     You don't need a prescription for these medications     etonogestrel 68 MG Impl                Primary Care Provider Office Phone # Fax #    Cori Cj Hayes [phone]       [address] [facility] [address]        Equal Access to Services     RAY BANKS : Hadii pattie liu Solakisha, waaxda luqadaha, qaybta kaalmada ayla, zander laird. So [facility] [phone].    ATENCIÓN: Si habla español, tiene a lacy disposición servicios gratuitos de asistencia lingüística. Llame al [phone].    We comply with applicable federal " civil rights laws and [place] laws. We do not discriminate on the basis of race, color, national origin, age, disability, sex, sexual orientation, or gender identity.            Thank you!     Thank you for choosing [facility]  for your care. Our goal is always to provide you with excellent care. Hearing back from our patients is one way we can continue to improve our services. Please take a few minutes to complete the written survey that you may receive in the mail after your visit with us. Thank you!             Your Updated Medication List - Protect others around you: Learn how to safely use, store and throw away your medicines at www.disposemymeds.org.          This list is accurate as of 2/15/18  4:29 PM.  Always use your most recent med list.                   Brand Name Dispense Instructions for use Diagnosis    etonogestrel 68 MG Impl    IMPLANON/NEXPLANON     1 each (68 mg) by Subdermal route continuous    Nexplanon insertion       sertraline 100 MG tablet    ZOLOFT    90 tablet    Take 1 tablet (100 mg) by mouth daily    ADRI (generalized anxiety disorder), Mild single current episode of major depressive disorder (H)

## 2018-04-04 ENCOUNTER — OFFICE VISIT (OUTPATIENT)
Dept: INTERNAL MEDICINE | Facility: CLINIC | Age: 23
End: 2018-04-04
Payer: COMMERCIAL

## 2018-04-04 VITALS
HEIGHT: 68 IN | TEMPERATURE: 98.1 F | HEART RATE: 108 BPM | OXYGEN SATURATION: 100 % | RESPIRATION RATE: 14 BRPM | WEIGHT: 120 LBS | BODY MASS INDEX: 18.19 KG/M2 | DIASTOLIC BLOOD PRESSURE: 72 MMHG | SYSTOLIC BLOOD PRESSURE: 114 MMHG

## 2018-04-04 DIAGNOSIS — F41.1 GAD (GENERALIZED ANXIETY DISORDER): Primary | ICD-10-CM

## 2018-04-04 DIAGNOSIS — F32.0 MILD SINGLE CURRENT EPISODE OF MAJOR DEPRESSIVE DISORDER (H): ICD-10-CM

## 2018-04-04 PROCEDURE — 99213 OFFICE O/P EST LOW 20 MIN: CPT | Performed by: NURSE PRACTITIONER

## 2018-04-04 RX ORDER — BUPROPION HYDROCHLORIDE 150 MG/1
150 TABLET, EXTENDED RELEASE ORAL 2 TIMES DAILY
Qty: 180 TABLET | Refills: 3 | Status: SHIPPED | OUTPATIENT
Start: 2018-04-04 | End: 2019-06-24

## 2018-04-04 ASSESSMENT — ANXIETY QUESTIONNAIRES
3. WORRYING TOO MUCH ABOUT DIFFERENT THINGS: MORE THAN HALF THE DAYS
6. BECOMING EASILY ANNOYED OR IRRITABLE: SEVERAL DAYS
2. NOT BEING ABLE TO STOP OR CONTROL WORRYING: NEARLY EVERY DAY
7. FEELING AFRAID AS IF SOMETHING AWFUL MIGHT HAPPEN: NOT AT ALL
1. FEELING NERVOUS, ANXIOUS, OR ON EDGE: NEARLY EVERY DAY
IF YOU CHECKED OFF ANY PROBLEMS ON THIS QUESTIONNAIRE, HOW DIFFICULT HAVE THESE PROBLEMS MADE IT FOR YOU TO DO YOUR WORK, TAKE CARE OF THINGS AT HOME, OR GET ALONG WITH OTHER PEOPLE: VERY DIFFICULT
GAD7 TOTAL SCORE: 13
5. BEING SO RESTLESS THAT IT IS HARD TO SIT STILL: SEVERAL DAYS

## 2018-04-04 ASSESSMENT — PATIENT HEALTH QUESTIONNAIRE - PHQ9: 5. POOR APPETITE OR OVEREATING: NEARLY EVERY DAY

## 2018-04-04 NOTE — PROGRESS NOTES
"  SUBJECTIVE:   Julia Palencia is a 22 year old female who presents to clinic today for the following health issues:      Depression and Anxiety Follow-Up    Status since last visit: increased depression, poor sleep    Other associated symptoms:anxiety, \"picking at skin\"    Complicating factors:     Significant life event: No     Current substance abuse: None    PHQ-9 12/19/2017 1/17/2018   Total Score 21 19   Q9: Suicide Ideation Several days Several days     ADRI-7 SCORE 12/19/2017 1/17/2018   Total Score 15 10       PHQ-9  English  PHQ-9   Any Language  ADRI-7  Suicide Assessment Five-step Evaluation and Treatment (SAFE-T)    Amount of exercise or physical activity: None    Problems taking medications regularly: No    Medication side effects: none    Diet: regular (no restrictions)      Patient Active Problem List   Diagnosis     ADRI (generalized anxiety disorder)     Mild single current episode of major depressive disorder (H)     Nexplanon insertion     History reviewed. No pertinent surgical history.    Social History   Substance Use Topics     Smoking status: Never Smoker     Smokeless tobacco: Never Used     Alcohol use Yes      Comment: 1-2 per week     Family History   Problem Relation Age of Onset     DIABETES Mother      GASTROINTESTINAL DISEASE Father      Depression Brother          Current Outpatient Prescriptions   Medication Sig Dispense Refill     buPROPion (WELLBUTRIN SR) 150 MG 12 hr tablet Take 1 tablet (150 mg) by mouth 2 times daily 180 tablet 3     etonogestrel (IMPLANON/NEXPLANON) 68 MG IMPL 1 each (68 mg) by Subdermal route continuous  0     sertraline (ZOLOFT) 100 MG tablet Take 1 tablet (100 mg) by mouth daily 90 tablet 1     BP Readings from Last 3 Encounters:   04/04/18 114/72   02/15/18 118/80   01/18/18 98/70    Wt Readings from Last 3 Encounters:   04/04/18 120 lb (54.4 kg)   02/15/18 119 lb (54 kg)   01/18/18 119 lb 3.2 oz (54.1 kg)                    Reviewed and updated as needed " "this visit by clinical staff  Tobacco  Allergies  Meds  Med Hx  Surg Hx  Fam Hx  Soc Hx      Reviewed and updated as needed this visit by Provider         ROS:  Constitutional, HEENT, cardiovascular, pulmonary, gi and gu systems are negative, except as otherwise noted.    OBJECTIVE:     /72 (BP Location: Right arm, Patient Position: Sitting, Cuff Size: Adult Regular)  Pulse 108  Temp 98.1  F (36.7  C) (Oral)  Resp 14  Ht 5' 7.5\" (1.715 m)  Wt 120 lb (54.4 kg)  LMP   SpO2 100%  Breastfeeding? No  BMI 18.52 kg/m2  Body mass index is 18.52 kg/(m^2).  GENERAL: healthy, alert and no distress  PSYCH: mentation appears normal, affect flat and anxious, unable to sit still during visit.        ASSESSMENT/PLAN:               ICD-10-CM    1. ADRI (generalized anxiety disorder) F41.1 buPROPion (WELLBUTRIN SR) 150 MG 12 hr tablet   2. Mild single current episode of major depressive disorder (H) F32.0 buPROPion (WELLBUTRIN SR) 150 MG 12 hr tablet       Recommended counseling, pt agrees.  F/u 1 month.    A total of 15 minutes was spent with the patient today, with greater than 50% of the visit involving counseling and coordination of care.      Cori Hayes NP  [facility]    "

## 2018-04-04 NOTE — NURSING NOTE
"Chief Complaint   Patient presents with     Recheck Medication       Initial /72 (BP Location: Right arm, Patient Position: Sitting, Cuff Size: Adult Regular)  Pulse 108  Temp 98.1  F (36.7  C) (Oral)  Resp 14  Ht 5' 7.5\" (1.715 m)  Wt 120 lb (54.4 kg)  LMP   SpO2 100%  Breastfeeding? No  BMI 18.52 kg/m2 Estimated body mass index is 18.52 kg/(m^2) as calculated from the following:    Height as of this encounter: 5' 7.5\" (1.715 m).    Weight as of this encounter: 120 lb (54.4 kg).  Medication Reconciliation: complete   Melanie TYSON      "

## 2018-04-04 NOTE — MR AVS SNAPSHOT
"              After Visit Summary   2018    Julia Palencia    MRN: [id number]           Patient Information     Date Of Birth          1995        Visit Information        Provider Department      2018 7:00 AM Cori Hayes NP [facility]        Today's Diagnoses     ADRI (generalized anxiety disorder)    -  1    Mild single current episode of major depressive disorder (H)           Follow-ups after your visit        Who to contact     If you have questions or need follow up information about today's clinic visit or your schedule please contact [facility] directly at [phone].  Normal or non-critical lab and imaging results will be communicated to you by Sentrigohart, letter or phone within 4 business days after the clinic has received the results. If you do not hear from us within 7 days, please contact the clinic through Sentrigohart or phone. If you have a critical or abnormal lab result, we will notify you by phone as soon as possible.  Submit refill requests through Cinegif or call your pharmacy and they will forward the refill request to us. Please allow 3 business days for your refill to be completed.          Additional Information About Your Visit        MyChart Information     Cinegif lets you send messages to your doctor, view your test results, renew your prescriptions, schedule appointments and more. To sign up, go to [email or web address] . Click on \"Log in\" on the left side of the screen, which will take you to the Welcome page. Then click on \"Sign up Now\" on the right side of the page.     You will be asked to enter the access code listed below, as well as some personal information. Please follow the directions to create your username and password.     Your access code is: [email or web address]  Expires: 7/3/2018  7:18 AM     Your access code will  in 90 days. If you need help or a new code, please call your [facility] or [phone].        Care " "EveryWhere ID     This is your Care EveryWhere ID. This could be used by other organizations to access your [place] medical records  [id number]        Your Vitals Were     Pulse Temperature Respirations Height Pulse Oximetry       108 98.1  F (36.7  C) (Oral) 14 5' 7.5\" (1.715 m) 100%     Breastfeeding? BMI (Body Mass Index)                No 18.52 kg/m2           Blood Pressure from Last 3 Encounters:   04/04/18 114/72   02/15/18 118/80   01/18/18 98/70    Weight from Last 3 Encounters:   04/04/18 120 lb (54.4 kg)   02/15/18 119 lb (54 kg)   01/18/18 119 lb 3.2 oz (54.1 kg)              Today, you had the following     No orders found for display         Today's Medication Changes          These changes are accurate as of 4/4/18  7:18 AM.  If you have any questions, ask your nurse or doctor.               Start taking these medicines.        Dose/Directions    buPROPion 150 MG 12 hr tablet   Commonly known as:  WELLBUTRIN SR   Used for:  ADRI (generalized anxiety disorder), Mild single current episode of major depressive disorder (H)   Started by:  Cori Hayes NP        Dose:  150 mg   Take 1 tablet (150 mg) by mouth 2 times daily   Quantity:  180 tablet   Refills:  3            Where to get your medicines      [id number]  [address]  [address] [place] [address] [place] [address]     Phone:  [phone]     buPROPion 150 MG 12 hr tablet                Primary Care Provider Office Phone # Fax #    Cori Hayes [phone]       [address] [facility] [address]        Equal Access to Services     RAY BANKS AH: Shabbir Lopez, nino luqlivia, qayuta kaalmada adehakeem, zander laird. So [facility] [phone].    ATENCIÓN: Si habla español, tiene a lacy disposición servicios gratuitos de asistencia lingüística. Llame al [phone].    We comply " with applicable federal civil rights laws and [place] laws. We do not discriminate on the basis of race, color, national origin, age, disability, sex, sexual orientation, or gender identity.            Thank you!     Thank you for choosing [facility]  for your care. Our goal is always to provide you with excellent care. Hearing back from our patients is one way we can continue to improve our services. Please take a few minutes to complete the written survey that you may receive in the mail after your visit with us. Thank you!             Your Updated Medication List - Protect others around you: Learn how to safely use, store and throw away your medicines at www.disposemymeds.org.          This list is accurate as of 4/4/18  7:18 AM.  Always use your most recent med list.                   Brand Name Dispense Instructions for use Diagnosis    buPROPion 150 MG 12 hr tablet    WELLBUTRIN SR    180 tablet    Take 1 tablet (150 mg) by mouth 2 times daily    ADRI (generalized anxiety disorder), Mild single current episode of major depressive disorder (H)       etonogestrel 68 MG Impl    IMPLANON/NEXPLANON     1 each (68 mg) by Subdermal route continuous    Nexplanon insertion       sertraline 100 MG tablet    ZOLOFT    90 tablet    Take 1 tablet (100 mg) by mouth daily    ADRI (generalized anxiety disorder), Mild single current episode of major depressive disorder (H)

## 2018-04-05 ASSESSMENT — ANXIETY QUESTIONNAIRES: GAD7 TOTAL SCORE: 13

## 2018-04-05 ASSESSMENT — PATIENT HEALTH QUESTIONNAIRE - PHQ9: SUM OF ALL RESPONSES TO PHQ QUESTIONS 1-9: 12

## 2018-04-11 ENCOUNTER — DOCUMENTATION ONLY (OUTPATIENT)
Dept: TRANSPLANT | Facility: CLINIC | Age: 23
End: 2018-04-11

## 2018-04-13 ENCOUNTER — TELEPHONE (OUTPATIENT)
Dept: TRANSPLANT | Facility: CLINIC | Age: 23
End: 2018-04-13

## 2018-04-13 NOTE — TELEPHONE ENCOUNTER
I called and left Julia an extensive voice mail message.  I let her know that we would not be able to bring her forward as a potential non-directed living kidney donor due to her unstable mental health.  Please see recent notes in her chart from her nurse practitioner, dated April 2018.  Ms. Palencia has anxiety and depression with suicidal ideation that is not well managed at the present time.  I let her know that we would not bring her forward and would want her to wait at least 1 year to see if her mental health improves and is more stable over time.  I left my direct number and encouraged her to call with questions/concerns.  I will ask that our intake nurse close her file.    GABRIELLE Teixeira, [facility]  Clinical  and Independent Donor Advocate  [facility]  Pager:  [phone]  Direct:  [phone]

## 2018-04-16 ENCOUNTER — NURSE TRIAGE (OUTPATIENT)
Dept: NURSING | Facility: CLINIC | Age: 23
End: 2018-04-16

## 2018-04-17 NOTE — TELEPHONE ENCOUNTER
"Aubree reports that she has been experiencing  \"bites\" on extremities for 2 days; concerned this is reaction to new medication bupropion    Describes as small red spots that will suddenly severely itch; present on ankles arms shoulders.  No lesions in web spaces or tops of feet    Has cats but does not think they have fleas  No obvious source of bedbugs and bed partner does not have bites  Advised in home are for symptoms  Given protocol information about bedbugs  Advise to check pets for fleas   Advise to be seen if bites persist or worsen, look infected or if she devlops and  new or worsenig symptoms  Jory Villatoro RN  FNA    Additional Information    [1] Localized rash AND [2] cause unknown    Negative: Poison ivy, oak, or sumac contact suspected    Negative: Insect bite(s) suspected    Negative: Ringworm suspected (i.e., round pink patch, sometimes looks like ring, usually 1/2 to 1 inch [12-25 mm],  in size, slowly increasing in size)    Negative: Athlete's Foot suspected (i.e., itchy rash between the toes)    Negative: Jock Itch suspected (i.e., itchy rash on inner thighs near genital area)    Negative: Wound infection suspected (i.e., pain, spreading redness, or pus; in a cut, puncture, scrape or sutured wound)    Negative: Impetigo suspected  (i.e., painless infected superficial small sores, less than 1 inch or 2.5 cm, often covered by a soft, yellow-brown scab or crust; sometimes occurring near nasal openings)    Negative: Shingles suspected (i.e., painful rash, multiple small blisters grouped together in one area of body; dermatomal distribution)    Negative: Rash of external female genital area (vulva)    Negative: Rash of penis or scrotum    Negative: Small spot, skin growth, or mole    Negative: Sores or skin ulcer, not a rash    Negative: Localized lump (or swelling) without redness or rash    Negative: [1] Red area or streak AND [2] fever    Negative: [1] Rash is painful to touch AND [2] fever    " Negative: [1] Looks infected (spreading redness, pus) AND [2] large red area (> 2 in. or 5 cm)    Negative: [1] Looks infected (spreading redness, pus) AND [2] diabetes mellitus or weak immune system (e.g., HIV positive,  cancer chemotherapy, chronic steroid treatment, splenectomy)    Negative: [1] Localized purple or blood-colored spots or dots AND [2] not from injury or friction AND [3] no fever    Negative: [1] Looks infected (spreading redness, pus) AND [2] no fever    Negative: Looks like a boil, infected sore, deep ulcer or other infected rash    Negative: [1] Localized rash is very painful AND [2] no fever    Negative: Genital area rash    Negative: Lyme disease suspected (e.g., bull's eye rash or tick bite / exposure)    Negative: [1] Applying cream or ointment AND [2] causes severe itch, burning or pain    Negative: [1] Pimples (localized) AND [2 ] NO improvement after using Care Advice    Negative: Tender bumps in armpits    Negative: [1] Severe localized itching AND [2] after 2 days of steroid cream and antihistamines    Negative: Localized rash present > 7 days    Negative: Red, moist, irritated area between skin folds (or under larger breasts)    Mild localized rash (all triage questions negative)    Negative: Anaphylactic reaction suspected (e.g., sudden onset of difficulty breathing, difficulty swallowing, wheezing following bite)    Negative: Sounds like a life-threatening emergency to the triager    Negative: Widespread hives    Negative: Impetigo suspected  (i.e., painless infected superficial small sores, < 1 inch or 2.5 cm, often covered by a soft, yellow-brown scab or crust; sometimes occurring near nasal openings)    Negative: Other insect bite suspected    Negative: Doesn't match the SYMPTOMS of bed bug bites    Negative: Patient sounds very sick or weak to the triager    Protocols used: RASH - GUIDELINE SELECTION-ADULT-, RASH OR REDNESS - LOCALIZED-ADULT-, BED BUG BITE-ADULT-AH

## 2018-04-26 ENCOUNTER — OFFICE VISIT (OUTPATIENT)
Dept: BEHAVIORAL HEALTH | Facility: CLINIC | Age: 23
End: 2018-04-26
Payer: COMMERCIAL

## 2018-04-26 DIAGNOSIS — F33.1 MODERATE EPISODE OF RECURRENT MAJOR DEPRESSIVE DISORDER (H): ICD-10-CM

## 2018-04-26 DIAGNOSIS — F41.1 GAD (GENERALIZED ANXIETY DISORDER): Primary | ICD-10-CM

## 2018-04-26 PROCEDURE — 90791 PSYCH DIAGNOSTIC EVALUATION: CPT | Performed by: MARRIAGE & FAMILY THERAPIST

## 2018-04-26 ASSESSMENT — PATIENT HEALTH QUESTIONNAIRE - PHQ9
SUM OF ALL RESPONSES TO PHQ QUESTIONS 1-9: 13
10. IF YOU CHECKED OFF ANY PROBLEMS, HOW DIFFICULT HAVE THESE PROBLEMS MADE IT FOR YOU TO DO YOUR WORK, TAKE CARE OF THINGS AT HOME, OR GET ALONG WITH OTHER PEOPLE: SOMEWHAT DIFFICULT
SUM OF ALL RESPONSES TO PHQ QUESTIONS 1-9: 13

## 2018-04-26 ASSESSMENT — ANXIETY QUESTIONNAIRES
1. FEELING NERVOUS, ANXIOUS, OR ON EDGE: NEARLY EVERY DAY
GAD7 TOTAL SCORE: 14
GAD7 TOTAL SCORE: 14
2. NOT BEING ABLE TO STOP OR CONTROL WORRYING: MORE THAN HALF THE DAYS
5. BEING SO RESTLESS THAT IT IS HARD TO SIT STILL: MORE THAN HALF THE DAYS
6. BECOMING EASILY ANNOYED OR IRRITABLE: MORE THAN HALF THE DAYS
GAD7 TOTAL SCORE: 14
3. WORRYING TOO MUCH ABOUT DIFFERENT THINGS: MORE THAN HALF THE DAYS
7. FEELING AFRAID AS IF SOMETHING AWFUL MIGHT HAPPEN: NOT AT ALL
4. TROUBLE RELAXING: NEARLY EVERY DAY
7. FEELING AFRAID AS IF SOMETHING AWFUL MIGHT HAPPEN: NOT AT ALL

## 2018-04-26 NOTE — PROGRESS NOTES
[facility]  Integrated Behavioral Health Services   Diagnostic Assessment      PATIENT'S NAME: Julia Palencia  MRN:   [id number]  :   1995  DATE OF SERVICE: 2018  SERVICE LOCATION: Face to Face in Clinic  Visit Activities: NEW and [facility] [place] Only      Identifying Information:  Patient is a 22 year old year old, , partnered / significant other female.  Patient attended the session alone.        Referral:  Patient was referred for an assessment by Cori Hayes at [facility].   Reason for referral: clarify behavioral health diagnosis, determine behavioral health treatment options, assess client readiness and motivation to change, assess client social situation, address the interaction of behavioral and medical issues and consultation on complex comorbid conditions.       Patient's Statement of Presenting Concern:  Patient reports the following reason(s) for seeking an assessment at this time: increased anxiety.  Patient stated that her symptoms have resulted in the following functional impairments: self-care, social interactions and work / vocational responsibilities      History of Presenting Concern:  Patient reports that these problem(s) began 5 years ago but has been getting worse. Not wanting to go out with friends, shaky and work, can't set still . Patient has attempted to resolve these concerns in the past through: medication(s) from physician / PCP and physician / PCP. Patient reports that other professional(s) are involved in providing support / services. Cori Hayes  Pt reports that it takes her an hour to fall asleep. Wakes up again in between 2-4 hours. Struggles to go back to sleep again or wakes up a bunch after that. Reports that she is hot, and sometimes cold. Struggles with being the center of attention, meeting new people, or people she doesn't know.     Social History:  Patient reported she grew up in [place], [place]. They  were the first born of 2 children.  Patient reported that her childhood was normal.  Patient reported a history of 1 committed relationships or marriages. Patient has been partnered / significant other for 7 years. Patient reported having 0 children. Patient identified some stable and meaningful social connections.     Patient lives with Gerardo.  Patient is currently employed full time.  Work history  [employer] 1.5 years. [employer].    Patient reported that she has not been involved with the legal system.  Patient's highest education level was associate degree / vocational certificate. Patient did not identify any learning problems. There are no ethnic, cultural or Restorationism factors that may be relevant for therapy.  Patient did not serve in the .       Mental Health History:  Patient reported the following biological family members or relatives with mental health issues: Mother experienced Anxiety, Bipolar Disorder and Depression, Brother experienced Anxiety and Depression. Patient has not received mental health services in the past. Patient is not currently receiving any mental health services.      Chemical Health History:  Patient reported no family history of chemical health issues. Patient has not received chemical dependency treatment in the past. Patient is not currently receiving any chemical dependency treatment. Patient reports no problems as a result of their drinking / drug use.      Cage-AID score is: 0Based on Cage-Aid score and clinical interview there  are not indications of drug or alcohol abuse.      Discussed the general effects of drugs and alcohol on health and well-being.      Significant Losses / Trauma / Abuse / Neglect Issues:  There are no indications or report of: significant losses, trauma, abuse or neglect.    Issues of possible neglect are not present.      Medical History:   Patient Active Problem List   Diagnosis     ADRI (generalized anxiety disorder)     Mild single current  episode of major depressive disorder (H)     Nexplanon insertion       Medication Review:  Current Outpatient Prescriptions   Medication     buPROPion (WELLBUTRIN SR) 150 MG 12 hr tablet     etonogestrel (IMPLANON/NEXPLANON) 68 MG IMPL     sertraline (ZOLOFT) 100 MG tablet     No current facility-administered medications for this visit.        Patient was provided recommendation to follow-up with physician.    Mental Status Assessment:  Appearance:   Appropriate   Eye Contact:   Good   Psychomotor Behavior: Agitated  Restless   Attitude:   Guarded   Orientation:   All  Speech   Rate / Production: Normal    Volume:  Normal   Mood:    Anxious   Affect:    Appropriate   Thought Content:  Clear   Thought Form:  Coherent  Logical   Insight:    Fair       Safety Assessment:    Patient has had a history of self-injurious behavior: a few months ago cut herself with a knife. Not deep.  Patient denies current or recent suicidal ideation or behaviors.  Patient denies current or recent homicidal ideation or behaviors.  Patient denies current or recent self injurious behavior or ideation.  Patient denies other safety concerns.  Patient reports there are no firearms in the house  Protective Factors Sense of responsibility to family, Life Satisfaction and Positive social support   Risk Factors Current high stress      Plan for Safety and Risk Management:  A safety and risk management plan has not been developed at this time, however patient was encouraged to call [facility] should there be a change in any of these risk factors.      Review of Symptoms:  Depression: Sleep Energy Appetite Psychomotor slowing or agitation Ruminations  Katelynn:  No symptoms  Psychosis: No symptoms  Anxiety: Nervousness Triggers: social   Panic:  Palpitations Tremors Shortness of Breath Tingling Numbness  Post Traumatic Stress Disorder: No symptoms  Obsessive Compulsive Disorder: No symptoms  Eating Disorder: No symptoms  Oppositional Defiant  Disorder: No symptoms  ADD / ADHD: No symptoms  Conduct Disorder: No symptoms    Patient's Strengths and Limitations:  Patient identified the following strengths or resources that will help her succeed in counseling: commitment to health and well being, friends / good social support, family support and positive work environment. Patient identified the following supports: family. Things that may interfere with the patien'ts success in behavioral health services include:denies.    Diagnostic Criteria:  A. Excessive anxiety and worry about a number of events or activities (such as work or school performance).   B. The person finds it difficult to control the worry.  C. Select 3 or more symptoms (required for diagnosis). Only one item is required in children.   - Restlessness or feeling keyed up or on edge.    - Being easily fatigued.    - Difficulty concentrating or mind going blank.    - Irritability.    - Muscle tension.    - Sleep disturbance (difficulty falling or staying asleep, or restless unsatisfying sleep).   D. The focus of the anxiety and worry is not confined to features of an Axis I disorder.  E. The anxiety, worry, or physical symptoms cause clinically significant distress or impairment in social, occupational, or other important areas of functioning.   F. The disturbance is not due to the direct physiological effects of a substance (e.g., a drug of abuse, a medication) or a general medical condition (e.g., hyperthyroidism) and does not occur exclusively during a Mood Disorder, a Psychotic Disorder, or a Pervasive Developmental Disorder.  1. Recurrent unexpected panic attacks and meets criteria 2, 3, and 4 (below)  2. At least one of the attacks has been followed by 1 month (or more) of one (or more) of the following:     (a) persistent concern about having additional attacks     (b) worry about the implications of the attack or its consequences     (c) a significant change in behavior related to the  attacks  3. Absence of agoraphobia  4. The panic attacks are not to the the direct physiological effects of a substance or general medical condition  5. The panic attacks are not better accounted for by another mental disorder, such as social phobia, specific phobia, OCD, PTSD, or separation anxiety disorder   - Depressed mood. Note: In children and adolescents, can be irritable mood.     - Diminished interest or pleasure in all, or almost all, activities.    - Decreased sleep.    - Psychomotor activity agitation.    - Diminished ability to think or concentrate, or indecisiveness.   B) The symptoms cause clinically significant distress or impairment in social, occupational, or other important areas of functioning  C) The episode is not attributable to the physiological effects of a substance or to another medical condition  D) The occurence of major depressive episode is not better explained by other thought / psychotic disorders  E) There has never been a manic episode or hypomanic episode      Functional Status:  Patient's symptoms are causing reduced functional status in the following areas: Occupational / Vocational - not able to set still      DSM5 Diagnoses: (Sustained by DSM5 Criteria Listed Above)  Diagnoses: 296.32 (F33.1) Major Depressive Disorder, Recurrent Episode, Moderate _ and With anxious distress  300.01 (F41.0) Panic Disorder  300.02 (F41.1) Generalized Anxiety Disorder  Psychosocial & Contextual Factors: social, work  WHODAS Score: 18  See Media section of EPIC medical record for completed WHODAS    Preliminary Treatment Plan:    The client reports no currently identified Episcopalian, ethnic or cultural issues relevant to therapy.    Initial Treatment will focus on: Depressed Mood - increase coping skills  Anxiety - increase coping skills.    Chemical dependency recommendations: No indications of CD issues    As a preliminary treatment goal, patient will experience a reduction in depressed mood,  will develop more effective coping skills to manage depressive symptoms, will develop healthy cognitive patterns and beliefs, will increase ability to function adaptively and will continue to take medications as prescribed / participate in supportive activities and services  and will experience a reduction in anxiety, will develop more effective coping skills to manage anxiety symptoms, will develop healthy cognitive patterns and beliefs and will increase ability to function adaptively.    The focus of initial interventions will be to alleviate anxiety and alleviate depressed mood.    The patient is receiving treatment / structured support from the following professional(s) / service and treatment. Collaboration will be initiated with: primary care physician.    Referral to another professional/service is not indicated at this time..    A Release of Information is not needed at this time.    Report to child or adult protection services was NA.    JOAN Chen, Behavioral Health Clinician       Answers for HPI/ROS submitted by the patient on 4/26/2018   If you checked off any problems, how difficult have these problems made it for you to do your work, take care of things at home, or get along with other people?: Somewhat difficult  PHQ9 TOTAL SCORE: 13  ADRI 7 TOTAL SCORE: 14

## 2018-04-26 NOTE — MR AVS SNAPSHOT
"              After Visit Summary   4/26/2018    Julia Palencia    MRN: [id number]           Patient Information     Date Of Birth          1995        Visit Information        Provider Department      4/26/2018 3:30 PM Chelsea Perkins LMFT [facility]        Care Instructions    aps- [place], Calm, [id number] (sleep)          Follow-ups after your visit        Your next 10 appointments already scheduled     May 07, 2018  7:30 AM CDT   [facility] with JOAN Chen   [facility]    [address] [facility] [address]  [facility] [address] [phone]              Who to contact     If you have questions or need follow up information about today's clinic visit or your schedule please contact [facility] directly at [phone].  Normal or non-critical lab and imaging results will be communicated to you by MyChart, letter or phone within 4 business days after the clinic has received the results. If you do not hear from us within 7 days, please contact the clinic through MyChart or phone. If you have a critical or abnormal lab result, we will notify you by phone as soon as possible.  Submit refill requests through [facility] or call your pharmacy and they will forward the refill request to us. Please allow 3 business days for your refill to be completed.          Additional Information About Your Visit        MyChart Information     [facility] lets you send messages to your doctor, view your test results, renew your prescriptions, schedule appointments and more. To sign up, go to [email or web address] . Click on \"Log in\" on the left side of the screen, which will take you to the Welcome page. Then click on \"Sign up Now\" on the right side of the page.     You will be asked to enter the access code listed below, as well as some personal information. Please follow the directions to create your username and password.     Your " access code is: [email or web address]  Expires: 7/3/2018  7:18 AM     Your access code will  in 90 days. If you need help or a new code, please call your [facility] clinic or [phone].        Care EveryWhere ID     This is your Care EveryWhere ID. This could be used by other organizations to access your [facility] medical records  [id number]         Blood Pressure from Last 3 Encounters:   18 114/72   02/15/18 118/80   18 98/70    Weight from Last 3 Encounters:   18 54.4 kg (120 lb)   02/15/18 54 kg (119 lb)   18 54.1 kg (119 lb 3.2 oz)              Today, you had the following     No orders found for display       Primary Care Provider Office Phone # Fax #    Cori Hayes [phone]       [address] [facility] [address]        Equal Access to Services     FRANKY BANKS : Hadii aad ku hadasho Soomaali, waaxda luqadaha, qaybta kaalmada adeegyada, waxay idiin hayaan adeeg kharash la'nessan . So [facility] [phone].    ATENCIÓN: Si habla español, tiene a lacy disposición servicios gratuitos de asistencia lingüística. Llame al [phone].    We comply with applicable federal civil rights laws and [place] laws. We do not discriminate on the basis of race, color, national origin, age, disability, sex, sexual orientation, or gender identity.            Thank you!     Thank you for choosing [facility]  for your care. Our goal is always to provide you with excellent care. Hearing back from our patients is one way we can continue to improve our services. Please take a few minutes to complete the written survey that you may receive in the mail after your visit with us. Thank you!             Your Updated Medication List - Protect others around you: Learn how to safely use, store and throw away your medicines at www.disposemymeds.org.          This list is accurate as of 18  3:58 PM.  Always use your most recent med list.                   Brand Name  Dispense Instructions for use Diagnosis    buPROPion 150 MG 12 hr tablet    WELLBUTRIN SR    180 tablet    Take 1 tablet (150 mg) by mouth 2 times daily    ADRI (generalized anxiety disorder), Mild single current episode of major depressive disorder (H)       etonogestrel 68 MG Impl    IMPLANON/NEXPLANON     1 each (68 mg) by Subdermal route continuous    Nexplanon insertion       sertraline 100 MG tablet    ZOLOFT    90 tablet    Take 1 tablet (100 mg) by mouth daily    ADRI (generalized anxiety disorder), Mild single current episode of major depressive disorder (H)

## 2018-04-27 ASSESSMENT — ANXIETY QUESTIONNAIRES: GAD7 TOTAL SCORE: 14

## 2018-04-27 ASSESSMENT — PATIENT HEALTH QUESTIONNAIRE - PHQ9: SUM OF ALL RESPONSES TO PHQ QUESTIONS 1-9: 13

## 2018-05-07 PROBLEM — Z91.199 NO-SHOW FOR APPOINTMENT: Status: ACTIVE | Noted: 2018-05-07

## 2018-05-24 ENCOUNTER — TELEPHONE (OUTPATIENT)
Dept: INTERNAL MEDICINE | Facility: CLINIC | Age: 23
End: 2018-05-24

## 2018-06-04 NOTE — TELEPHONE ENCOUNTER
2nd attempt, vm left for patient to call back, please complete Phq9 over the phone.  SHARON PLATT CMA

## 2018-06-26 DIAGNOSIS — F41.1 GAD (GENERALIZED ANXIETY DISORDER): ICD-10-CM

## 2018-06-26 DIAGNOSIS — F32.0 MILD SINGLE CURRENT EPISODE OF MAJOR DEPRESSIVE DISORDER (H): ICD-10-CM

## 2018-06-27 RX ORDER — SERTRALINE HYDROCHLORIDE 100 MG/1
TABLET, FILM COATED ORAL
Qty: 90 TABLET | Refills: 0 | Status: SHIPPED | OUTPATIENT
Start: 2018-06-27 | End: 2018-09-19

## 2018-06-28 ASSESSMENT — PATIENT HEALTH QUESTIONNAIRE - PHQ9: SUM OF ALL RESPONSES TO PHQ QUESTIONS 1-9: 9

## 2018-08-15 ENCOUNTER — OFFICE VISIT (OUTPATIENT)
Dept: INTERNAL MEDICINE | Facility: CLINIC | Age: 23
End: 2018-08-15
Payer: COMMERCIAL

## 2018-08-15 VITALS
WEIGHT: 115.2 LBS | DIASTOLIC BLOOD PRESSURE: 62 MMHG | OXYGEN SATURATION: 100 % | BODY MASS INDEX: 17.46 KG/M2 | HEIGHT: 68 IN | SYSTOLIC BLOOD PRESSURE: 98 MMHG | RESPIRATION RATE: 16 BRPM | TEMPERATURE: 98.6 F | HEART RATE: 98 BPM

## 2018-08-15 DIAGNOSIS — K58.1 IRRITABLE BOWEL SYNDROME WITH CONSTIPATION: Primary | ICD-10-CM

## 2018-08-15 DIAGNOSIS — Z23 NEED FOR VACCINATION: ICD-10-CM

## 2018-08-15 PROCEDURE — 90651 9VHPV VACCINE 2/3 DOSE IM: CPT | Performed by: NURSE PRACTITIONER

## 2018-08-15 PROCEDURE — 99213 OFFICE O/P EST LOW 20 MIN: CPT | Mod: 25 | Performed by: NURSE PRACTITIONER

## 2018-08-15 PROCEDURE — 90471 IMMUNIZATION ADMIN: CPT | Performed by: NURSE PRACTITIONER

## 2018-08-15 NOTE — NURSING NOTE
Screening Questionnaire for Adult Immunization    Are you sick today?   No   Do you have allergies to medications, food, a vaccine component or latex?   No   Have you ever had a serious reaction after receiving a vaccination?   No   Do you have a long-term health problem with heart disease, lung disease, asthma, kidney disease, metabolic disease (e.g. diabetes), anemia, or other blood disorder?   No   Do you have cancer, leukemia, HIV/AIDS, or any other immune system problem?   No   In the past 3 months, have you taken medications that affect  your immune system, such as prednisone, other steroids, or anticancer drugs; drugs for the treatment of rheumatoid arthritis, Crohn s disease, or psoriasis; or have you had radiation treatments?   No   Have you had a seizure, or a brain or other nervous system problem?   No   During the past year, have you received a transfusion of blood or blood     products, or been given immune (gamma) globulin or antiviral drug?   No   For women: Are you pregnant or is there a chance you could become        pregnant during the next month?   No   Have you received any vaccinations in the past 4 weeks?   No     Immunization questionnaire answers were all negative.        Per orders of Cori Hayes,CNP injection of HPV given by Shruthi Rodriguez. Patient instructed to remain in clinic for 15 minutes afterwards, and to report any adverse reaction to me immediately.       Screening performed by Shruthi Rodriguez on 8/15/2018 at 7:26 AM.

## 2018-08-15 NOTE — PROGRESS NOTES
SUBJECTIVE:   Julia Palencia is a 23 year old female who presents to clinic today for the following health issues:      Gastrointestinal symptoms      Duration: months    Description:  Constipation, intermittent diarrhea, poor appetite, wt loss     Intensity:  moderate    Accompanying signs and symptoms:  none    History  Previous {similar problem: no   Previous evaluation:  none    Aggravating factors: none    Alleviating factors: nothing    Other Therapies tried: None    Concern for Crohns disease in her father.          Problem list and histories reviewed & adjusted, as indicated.  Additional history: as documented    Patient Active Problem List   Diagnosis     ADRI (generalized anxiety disorder)     Mild single current episode of major depressive disorder (H)     Nexplanon insertion     Moderate episode of recurrent major depressive disorder (H)     No-show for appointment     History reviewed. No pertinent surgical history.    Social History   Substance Use Topics     Smoking status: Never Smoker     Smokeless tobacco: Never Used     Alcohol use Yes      Comment: 1-2 per week     Family History   Problem Relation Age of Onset     Diabetes Mother      GASTROINTESTINAL DISEASE Father      Depression Brother          Current Outpatient Prescriptions   Medication Sig Dispense Refill     buPROPion (WELLBUTRIN SR) 150 MG 12 hr tablet Take 1 tablet (150 mg) by mouth 2 times daily 180 tablet 3     etonogestrel (IMPLANON/NEXPLANON) 68 MG IMPL 1 each (68 mg) by Subdermal route continuous  0     sertraline (ZOLOFT) 100 MG tablet TAKE 1 TABLET(100 MG) BY MOUTH DAILY 90 tablet 0     BP Readings from Last 3 Encounters:   08/15/18 98/62   04/04/18 114/72   02/15/18 118/80    Wt Readings from Last 3 Encounters:   08/15/18 115 lb 3.2 oz (52.3 kg)   04/04/18 120 lb (54.4 kg)   02/15/18 119 lb (54 kg)                    Reviewed and updated as needed this visit by clinical staff  Tobacco  Allergies  Meds  Med Hx  Surg Hx   "Fam Hx  Soc Hx      Reviewed and updated as needed this visit by Provider         ROS:  CONSTITUTIONAL:POSITIVE  for weight loss 5#  ENT/MOUTH: NEGATIVE for ear, mouth and throat problems  RESP: NEGATIVE for significant cough or SOB  CV: NEGATIVE for chest pain, palpitations or peripheral edema  GI: NEGATIVE for dyspepsia, melena and vomiting  ENDOCRINE: NEGATIVE for temperature intolerance, skin/hair changes  HEME/ALLERGY/IMMUNE: NEGATIVE for bleeding problems  PSYCHIATRIC: POSITIVE foranxiety    OBJECTIVE:     BP 98/62 (BP Location: Right arm, Patient Position: Sitting, Cuff Size: Adult Regular)  Pulse 98  Temp 98.6  F (37  C) (Oral)  Resp 16  Ht 5' 7.5\" (1.715 m)  Wt 115 lb 3.2 oz (52.3 kg)  LMP 08/13/2018 (Exact Date)  SpO2 100%  BMI 17.78 kg/m2  Body mass index is 17.78 kg/(m^2).  GENERAL: Thin, alert and no distress  NECK: no adenopathy, no asymmetry, masses, or scars and thyroid normal to palpation  RESP: lungs clear to auscultation - no rales, rhonchi or wheezes  CV: regular rate and rhythm, normal S1 S2, no S3 or S4, no murmur, click or rub, no peripheral edema and peripheral pulses strong  ABDOMEN: soft, nontender, no hepatosplenomegaly, no masses and bowel sounds normal  PSYCH: mentation appears normal, affect flat and anxious        ASSESSMENT/PLAN:     (K58.1) Irritable bowel syndrome with constipation  (primary encounter diagnosis)  Comment:   Plan: Given pt ed materials on Crohns disease.  Encouraged  miralax    (Z23) Need for vaccination  Comment:   Plan: HUMAN PAPILLOMA VIRUS (GARDASIL 9) VACCINE         [92676], 1st  Administration  [11640]        Pt became light headed after injection, recovered well after juice and laying down.                Cori Hayes NP  [facility]    "

## 2018-08-15 NOTE — NURSING NOTE
Patient had a reaction to HPV of light headedness, patient was instructed to lay down, MA checked on patient every 10 minutes, apple juice was given to patient, after 30 minutes patient was feeling better. Patient stated that she felt fine to leave, patient was instructed to wait a few more minutes in the lobby to make sure she felt fine. Patent left 10 minutes later feeling fine.  SHARON PLATT,CMA

## 2018-08-15 NOTE — MR AVS SNAPSHOT
"              After Visit Summary   8/15/2018    Julia Palencia    MRN: [id number]           Patient Information     Date Of Birth          1995        Visit Information        Provider Department      8/15/2018 6:40 AM Cori Hayes NP [facility]        Today's Diagnoses     Irritable bowel syndrome with constipation    -  1    Need for vaccination           Follow-ups after your visit        Who to contact     If you have questions or need follow up information about today's clinic visit or your schedule please contact [facility] directly at [phone].  Normal or non-critical lab and imaging results will be communicated to you by [employer], letter or phone within 4 business days after the clinic has received the results. If you do not hear from us within 7 days, please contact the clinic through [employer]mehart or phone. If you have a critical or abnormal lab result, we will notify you by phone as soon as possible.  Submit refill requests through "[employer]" or call your pharmacy and they will forward the refill request to us. Please allow 3 business days for your refill to be completed.          Additional Information About Your Visit        MyChart Information     "[employer] lets you send messages to your doctor, view your test results, renew your prescriptions, schedule appointments and more. To sign up, go to [email or web address]" . Click on \"Log in\" on the left side of the screen, which will take you to the Welcome page. Then click on \"Sign up Now\" on the right side of the page.     You will be asked to enter the access code listed below, as well as some personal information. Please follow the directions to create your username and password.     Your access code is: [email or web address]  Expires: 2018  6:47 AM     Your access code will  in 90 days. If you need help or a new code, please call your [facility] or [phone].        Care EveryWhere ID     This is your " "Care EveryWhere ID. This could be used by other organizations to access your [place] medical records  [id number]        Your Vitals Were     Pulse Temperature Respirations Height Last Period Pulse Oximetry    98 98.6  F (37  C) (Oral) 16 5' 7.5\" (1.715 m) 08/13/2018 (Exact Date) 100%    BMI (Body Mass Index)                   17.78 kg/m2            Blood Pressure from Last 3 Encounters:   08/15/18 98/62   04/04/18 114/72   02/15/18 118/80    Weight from Last 3 Encounters:   08/15/18 115 lb 3.2 oz (52.3 kg)   04/04/18 120 lb (54.4 kg)   02/15/18 119 lb (54 kg)              We Performed the Following     1st  Administration  [[id number]]     HUMAN PAPILLOMA VIRUS (GARDASIL 9) VACCINE [[id number]]        Primary Care Provider Office Phone # Fax #    Cori Hayes, DHRUV [phone]       [address] [facility] [address]        Equal Access to Services     [facility] Hospital[facility]ON[facility]AN : Hadii aad ku hadasho Soomaali, waaxda luqadaha, qaybta kaalmada adeegyada, zander galeano hayblayne khalil . So [facility] [phone].    ATENCIÓN: Si habla español, tiene a lacy disposición servicios gratuitos de asistencia lingüística. Llame al [phone].    We comply with applicable federal civil rights laws and [place] laws. We do not discriminate on the basis of race, color, national origin, age, disability, sex, sexual orientation, or gender identity.            Thank you!     Thank you for choosing [facility]  for your care. Our goal is always to provide you with excellent care. Hearing back from our patients is one way we can continue to improve our services. Please take a few minutes to complete the written survey that you may receive in the mail after your visit with us. Thank you!             Your Updated Medication List - Protect others around you: Learn how to safely use, store and throw away your medicines at www.disposemymeds.org.          This list is accurate as of 8/15/18  8:18 AM.  Always use " your most recent med list.                   Brand Name Dispense Instructions for use Diagnosis    buPROPion 150 MG 12 hr tablet    WELLBUTRIN SR    180 tablet    Take 1 tablet (150 mg) by mouth 2 times daily    ADRI (generalized anxiety disorder), Mild single current episode of major depressive disorder (H)       etonogestrel 68 MG Impl    IMPLANON/NEXPLANON     1 each (68 mg) by Subdermal route continuous    Nexplanon insertion       sertraline 100 MG tablet    ZOLOFT    90 tablet    TAKE 1 TABLET(100 MG) BY MOUTH DAILY    ADRI (generalized anxiety disorder), Mild single current episode of major depressive disorder (H)

## 2018-08-18 ENCOUNTER — VIRTUAL VISIT (OUTPATIENT)
Dept: FAMILY MEDICINE | Facility: OTHER | Age: 23
End: 2018-08-18

## 2018-08-19 NOTE — PROGRESS NOTES
"Date:   Clinician: Yolanda Rdz  Clinician NPI: [id number]  Patient: Julia Palencia  Patient : 1995  Patient Address: [address] [place] [address]  Patient Phone: [phone]  Visit Protocol: URI  Patient Summary:  Julia is a 23 year old ( : 1995 ) female who initiated a Visit for cold, sinus infection, or influenza. When asked the question \"Please sign me up to receive news, health information and promotions from [employer].\", Julia responded \"No\".    Julia states her symptoms started gradually 3-6 days ago.   Her symptoms consist of myalgia, facial pain or pressure, chills, a cough, rhinitis, malaise, nasal congestion, ear pain, a sore throat, and a headache. Julia also feels feverish.   Symptom details     Nasal secretions: The color of her mucus is blood-tinged, green, white, and clear.    Cough: Julia coughs every 5-10 minutes and her cough is more bothersome at night. Phlegm comes into her throat when she coughs. She does not believe the phlegm causes the cough. The color of the phlegm is blood-tinged, white, clear, and green.     Sore throat: Julia reports having moderate throat pain (between 4-6 on a 10 point pain scale), does not have exudate on her tonsils, and can swallow liquids. She is not sure if the lymph nodes in her neck are enlarged. A rash has not appeared on the skin since the sore throat started.     Temperature: Her current temperature is 97.5 degrees Fahrenheit.     Facial pain or pressure: The facial pain or pressure does not feel worse when bending or leaning forward.     Headache: She states the headache is moderate (between 4-6 on a 10 point pain scale).      Julia denies having wheezing, dyspnea, and teeth pain. She also denies having recent facial or sinus surgery in the past 60 days, taking antibiotic medication for the symptoms, double sickening (worsening symptoms after initial improvement), and having a sinus infection within the past year.   " Within the past week, Julia has not been exposed to someone with strep throat. She has not recently been exposed to someone with influenza. Julia has not been in close contact with any high risk individuals.   Weight: 115 lbs   Julia does not smoke or use smokeless tobacco.   She denies pregnancy and denies breastfeeding. She has menstruated in the past month.   Additional information as reported by the patient (free text): This seems to be turning into something I  had a couple years back (sinus-eye infection deal). The throat (pain-cough), nose (congestion-runniness), and congested ear feeling came first and now my eyes are starting to feel goopy. Last time the eyes got to the point where they would crust over and ooze all the time.   MEDICATIONS: Nexplanon subdermal, Wellbutrin SR oral, sertraline oral, ALLERGIES: Penicillins  Clinician Response:  Dear Julia,  Based on the information provided, you have a viral upper respiratory infection, otherwise known as a cold. Symptoms vary from person to person, but can include sneezing, coughing, a runny nose, sore throat, and headache and range from mild to severe.  Unfortunately, there are no medications that can cure a cold, so treatment is focused on controlling symptoms as much as possible. Most people gradually feel better until symptoms are gone in 1-2 weeks.  Medication information  Because you have a viral infection, antibiotics will not help you get better. Treating a viral infection with antibiotics could actually make you feel worse.  Unless you are allergic to the over-the-counter medication(s) below, I recommend using:     An antihistamine such as Benadryl, Claritin, or store brand.    A decongestant such as Sudafed PE or store brand.      Oxymetazoline (Afrin or store brand) nasal spray. Use 1 spray in each nostril 2 times a day for a maximum of 3 days. Using this medication more frequently or longer than recommended may cause nasal congestion to reoccur or  worsen. Sinus pressure occurs when the tissues lining your sinuses become swollen and inflamed. Afrin nasal spray decreases the swelling to provide the quickest and most effective relief from sinus pressure.      Over-the-counter medications do not require a prescription. Ask the pharmacist if you have any questions.  Self care  The following tips will keep you as comfortable as possible while you recover:     Rest    Drink plenty of water and other liquids    Take a hot shower to loosen congestion    Use throat lozenges    Gargle with warm salt water (1/4 teaspoon of salt per 8 ounce glass of water)    Suck on frozen items such as popsicles or ice cubes    Drink hot tea with lemon and honey    Take a spoonful of honey to reduce your cough     When to seek care  Please be seen in a clinic or urgent care if new symptoms develop, or symptoms become worse.  Call 911 or go to the emergency room if you feel that your throat is closing off, you suddenly develop a rash, you are unable to swallow fluids, you are drooling, or you are having difficulty breathing.   Diagnosis: Viral URI  Diagnosis ICD: J06.9  Diagnosis ICD: 462.0

## 2018-08-20 ENCOUNTER — TRANSFERRED RECORDS (OUTPATIENT)
Dept: HEALTH INFORMATION MANAGEMENT | Facility: CLINIC | Age: 23
End: 2018-08-20

## 2018-08-22 ENCOUNTER — TELEPHONE (OUTPATIENT)
Dept: INTERNAL MEDICINE | Facility: CLINIC | Age: 23
End: 2018-08-22

## 2018-08-22 NOTE — LETTER
[facility]  [address], [address]  [place], [place]  [address]                                            TEL:[phone]  FAX:[phone]      Julia Palencia  [address] [facility] [address]      August 22, 2018    Dear Julia,    At [facility] we care about your health and well-being.  A review of your chart has indicated that you are due for a Pap.  Please contact us at [phone] to schedule an appointment.    If you have already had one or all of the above screening tests at another facility, please call us to update your chart.     Sincerely,      Cori Hayes C.N.P.

## 2018-08-22 NOTE — TELEPHONE ENCOUNTER
Panel Management Review      Patient has the following on her problem list: None      Composite cancer screening  Chart review shows that this patient is due/due soon for the following Pap Smear  Summary:    Patient is due/failing the following:   PAP    Action needed:   Patient needs office visit for Pap.    Type of outreach:    Sent letter.    Questions for provider review:    None                                                                                                                                    SHARON PLATT CMA       Chart routed to Care Team .

## 2018-08-22 NOTE — LETTER
[facility]  [address], [address]  [place], [place]  [address]                                            TEL:[phone]  FAX:[phone]      Julia Palencia  [address] [facility] [address]      September 5, 2018    Dear Julia,    We sent you a letter a couple of weeks ago informing you of health maintenance that is due. We hope that you received it. This letter is just a follow up to remind you to schedule an appointment.            Sincerely,      Cori Hayes C.N.P.

## 2018-09-19 DIAGNOSIS — F32.0 MILD SINGLE CURRENT EPISODE OF MAJOR DEPRESSIVE DISORDER (H): ICD-10-CM

## 2018-09-19 DIAGNOSIS — F41.1 GAD (GENERALIZED ANXIETY DISORDER): ICD-10-CM

## 2018-09-19 NOTE — TELEPHONE ENCOUNTER
"Requested Prescriptions   Pending Prescriptions Disp Refills     sertraline (ZOLOFT) 100 MG tablet [Pharmacy Med Name: SERTRALINE 100MG TABLETS] 90 tablet 0    Last Written Prescription Date:  06/27/2018  Last Fill Quantity: 90,  # refills: 0   Last office visit: 8/15/2018 with prescribing provider:     Future Office Visit:   Sig: TAKE 1 TABLET(100 MG) BY MOUTH DAILY    SSRIs Protocol Failed    9/19/2018 10:24 AM       Failed - PHQ-9 score less than 5 in past 6 months    Please review last PHQ-9 score.          Passed - Patient is age 18 or older       Passed - No active pregnancy on record       Passed - No positive pregnancy test in last 12 months       Passed - Recent (6 mo) or future (30 days) visit within the authorizing provider's specialty    Patient had office visit in the last 6 months or has a visit in the next 30 days with authorizing provider or within the authorizing provider's specialty.  See \"Patient Info\" tab in inbasket, or \"Choose Columns\" in Meds & Orders section of the refill encounter.            "

## 2018-09-21 RX ORDER — SERTRALINE HYDROCHLORIDE 100 MG/1
TABLET, FILM COATED ORAL
Qty: 90 TABLET | Refills: 0 | Status: SHIPPED | OUTPATIENT
Start: 2018-09-21 | End: 2019-02-09

## 2018-09-21 NOTE — TELEPHONE ENCOUNTER
PHQ-9 SCORE 4/4/2018 4/26/2018 6/27/2018   Total Score MyChart - 13 (Moderate depression) -   Total Score 12 13 9     Routing refill request to provider for review/approval because:  Labs out of range:  PHQ-9 greater than 4

## 2019-01-24 ENCOUNTER — TELEPHONE (OUTPATIENT)
Dept: INTERNAL MEDICINE | Facility: CLINIC | Age: 24
End: 2019-01-24

## 2019-01-24 NOTE — TELEPHONE ENCOUNTER
Panel Management Review      Patient has the following on her problem list:     Depression / Dysthymia review    Measure:  Needs PHQ-9 score of 4 or less during index window.  Administer PHQ-9 and if score is 5 or more, send encounter to provider for next steps.    5 - 7 month window range: Phq9    PHQ-9 SCORE 4/4/2018 4/26/2018 6/27/2018   PHQ-9 Total Score MyChart - 13 (Moderate depression) -   PHQ-9 Total Score 12 13 9       If PHQ-9 recheck is 5 or more, route to provider for next steps.    Patient is due for:  PHQ9      Composite cancer screening  Chart review shows that this patient is due/due soon for the following Pap Smear  Summary:    Patient is due/failing the following:   PAP and PHQ9    Action needed:   Patient needs office visit for Pap. and Patient needs to do PHQ9.    Type of outreach:    Sent [employer] message.    Questions for provider review:    None                                                                                                                                    SHARON PLATT CMA       Chart routed to no one .

## 2019-02-09 DIAGNOSIS — F32.0 MILD SINGLE CURRENT EPISODE OF MAJOR DEPRESSIVE DISORDER (H): ICD-10-CM

## 2019-02-09 DIAGNOSIS — F41.1 GAD (GENERALIZED ANXIETY DISORDER): ICD-10-CM

## 2019-02-11 RX ORDER — SERTRALINE HYDROCHLORIDE 100 MG/1
TABLET, FILM COATED ORAL
Qty: 90 TABLET | Refills: 0 | Status: SHIPPED | OUTPATIENT
Start: 2019-02-11 | End: 2019-06-24

## 2019-02-11 NOTE — TELEPHONE ENCOUNTER
Last phq-9 score 6 on 1/24/19.    Routing refill request to provider for review/approval because:  PHQ-9 >5.  Brittaney Campbell RN

## 2019-02-11 NOTE — TELEPHONE ENCOUNTER
"Requested Prescriptions   Pending Prescriptions Disp Refills     sertraline (ZOLOFT) 100 MG tablet [Pharmacy Med Name: SERTRALINE 100MG TABLETS] 90 tablet 0    Last Written Prescription Date:  09/21/2018  Last Fill Quantity: 90,  # refills: 0   Last office visit: 8/15/2018 with prescribing provider:     Future Office Visit:   Sig: TAKE 1 TABLET(100 MG) BY MOUTH DAILY    SSRIs Protocol Failed - 2/9/2019 11:13 AM       Failed - PHQ-9 score less than 5 in past 6 months    Please review last PHQ-9 score.          Passed - Medication is active on med list       Passed - Patient is age 18 or older       Passed - No active pregnancy on record       Passed - No positive pregnancy test in last 12 months       Passed - Recent (6 mo) or future (30 days) visit within the authorizing provider's specialty    Patient had office visit in the last 6 months or has a visit in the next 30 days with authorizing provider or within the authorizing provider's specialty.  See \"Patient Info\" tab in inbasket, or \"Choose Columns\" in Meds & Orders section of the refill encounter.            "

## 2019-06-24 DIAGNOSIS — F32.0 MILD SINGLE CURRENT EPISODE OF MAJOR DEPRESSIVE DISORDER (H): ICD-10-CM

## 2019-06-24 DIAGNOSIS — F41.1 GAD (GENERALIZED ANXIETY DISORDER): ICD-10-CM

## 2019-06-24 NOTE — TELEPHONE ENCOUNTER
"Requested Prescriptions   Pending Prescriptions Disp Refills     buPROPion (WELLBUTRIN SR) 150 MG 12 hr tablet [Pharmacy Med Name:  Last Written Prescription Date:  4/4/2018  Last Fill Quantity: 180,  # refills: 3   Last office visit: 8/15/2018 with prescribing provider:     Future Office Visit:   BUPROPION SR 150MG TABLETS (12 H)] 180 tablet 0     Sig: TAKE 1 TABLET(150 MG) BY MOUTH TWICE DAILY       SSRIs Protocol Failed - 6/24/2019  1:08 PM        Failed - PHQ-9 score less than 5 in past 6 months     Please review last PHQ-9 score.           Failed - Recent (6 mo) or future (30 days) visit within the authorizing provider's specialty     Patient had office visit in the last 6 months or has a visit in the next 30 days with authorizing provider or within the authorizing provider's specialty.  See \"Patient Info\" tab in inbasket, or \"Choose Columns\" in Meds & Orders section of the refill encounter.            Passed - Medication is Bupropion     If the medication is Bupropion (Wellbutrin), and the patient is taking for smoking cessation; OK to refill.          Passed - Medication is active on med list        Passed - Patient is age 18 or older        Passed - No active pregnancy on record        Passed - No positive pregnancy test in last 12 months        sertraline (ZOLOFT) 100 MG tablet [Pharmacy Med Name: SERTRALINE 100MG  Last Written Prescription Date:  2/11/2019  Last Fill Quantity: 90,  # refills: 0   Last office visit: 8/15/2018 with prescribing provider:     Future Office Visit:   TABLETS] 90 tablet 0     Sig: TAKE 1 TABLET(100 MG) BY MOUTH DAILY       SSRIs Protocol Failed - 6/24/2019  1:08 PM        Failed - PHQ-9 score less than 5 in past 6 months     Please review last PHQ-9 score.           Failed - Recent (6 mo) or future (30 days) visit within the authorizing provider's specialty     Patient had office visit in the last 6 months or has a visit in the next 30 days with authorizing provider or within the " "authorizing provider's specialty.  See \"Patient Info\" tab in inbasket, or \"Choose Columns\" in Meds & Orders section of the refill encounter.            Passed - Medication is Bupropion     If the medication is Bupropion (Wellbutrin), and the patient is taking for smoking cessation; OK to refill.          Passed - Medication is active on med list        Passed - Patient is age 18 or older        Passed - No active pregnancy on record        Passed - No positive pregnancy test in last 12 months        "

## 2019-06-24 NOTE — LETTER
[facility]  [address], [address]  [place], [place]  [address]                                            TEL:[phone]  FAX:[phone]      Julia Palencia  [address] [facility] [address]      June 25, 2019    Dear Julia,    I am working on your refill and noticed that you are overdue for an office visit to follow up on your diabetes.  Please call [phone] to schedule an appointment for follow up.  Thanks for your attention in this matter.    Sincerely,      [facility]

## 2019-06-25 NOTE — TELEPHONE ENCOUNTER
Routing refill request to provider for review/approval because:  Patient needs to be seen because it has been more than 1 year since last office visit.  phq 9 scores    Letter sent             PHQ-9 SCORE 4/26/2018 6/27/2018 1/24/2019   PHQ-9 Total Score [place] 13 (Moderate depression) - 6 (Mild depression)   PHQ-9 Total Score 13 9 6

## 2019-06-26 RX ORDER — BUPROPION HYDROCHLORIDE 150 MG/1
TABLET, EXTENDED RELEASE ORAL
Qty: 60 TABLET | Refills: 0 | Status: SHIPPED | OUTPATIENT
Start: 2019-06-26 | End: 2019-06-28

## 2019-06-26 RX ORDER — SERTRALINE HYDROCHLORIDE 100 MG/1
TABLET, FILM COATED ORAL
Qty: 90 TABLET | Refills: 0 | Status: SHIPPED | OUTPATIENT
Start: 2019-06-26 | End: 2019-06-28

## 2019-06-28 RX ORDER — BUPROPION HYDROCHLORIDE 150 MG/1
TABLET, EXTENDED RELEASE ORAL
Qty: 60 TABLET | Refills: 0 | Status: SHIPPED | OUTPATIENT
Start: 2019-06-28 | End: 2020-02-04

## 2019-06-28 RX ORDER — SERTRALINE HYDROCHLORIDE 100 MG/1
100 TABLET, FILM COATED ORAL DAILY
Qty: 30 TABLET | Refills: 0 | Status: SHIPPED | OUTPATIENT
Start: 2019-06-28 | End: 2020-02-04

## 2019-06-28 NOTE — TELEPHONE ENCOUNTER
Sent to wrong pharmacy, please resend to pended pharmacy.    Shruthi Sheridan CMA  [facility]  [place]

## 2019-07-22 ENCOUNTER — TELEPHONE (OUTPATIENT)
Dept: INTERNAL MEDICINE | Facility: CLINIC | Age: 24
End: 2019-07-22

## 2019-07-22 NOTE — TELEPHONE ENCOUNTER
Panel Management Review      Patient has the following on her problem list:     Depression / Dysthymia review    Measure:  Needs PHQ-9 score of 4 or less during index window.  Administer PHQ-9 and if score is 5 or more, send encounter to provider for next steps.    5 - 7 month window range: Phq9    PHQ-9 SCORE 4/26/2018 6/27/2018 1/24/2019   PHQ-9 Total Score MyChart 13 (Moderate depression) - 6 (Mild depression)   PHQ-9 Total Score 13 9 6       If PHQ-9 recheck is 5 or more, route to provider for next steps.    Patient is due for:  PHQ9      Composite cancer screening  Chart review shows that this patient is due/due soon for the following Pap Smear  Summary:    Patient is due/failing the following:   PAP and PHQ9    Action needed:   Patient needs office visit for Pap. and Patient needs to do PHQ9.    Type of outreach:    Phone, left message for patient to call back.  and Sent MyChart message.    Questions for provider review:    None                                                                                                                                    SHARON PLATT CMA       Chart routed to no one .

## 2019-10-03 ENCOUNTER — HEALTH MAINTENANCE LETTER (OUTPATIENT)
Age: 24
End: 2019-10-03

## 2020-01-22 ENCOUNTER — OFFICE VISIT (OUTPATIENT)
Dept: OBGYN | Facility: CLINIC | Age: 25
End: 2020-01-22
Payer: COMMERCIAL

## 2020-01-22 VITALS — SYSTOLIC BLOOD PRESSURE: 112 MMHG | BODY MASS INDEX: 18.52 KG/M2 | DIASTOLIC BLOOD PRESSURE: 72 MMHG | WEIGHT: 120 LBS

## 2020-01-22 DIAGNOSIS — Z30.46 ENCOUNTER FOR REMOVAL OF SUBDERMAL CONTRACEPTIVE IMPLANT: Primary | ICD-10-CM

## 2020-01-22 DIAGNOSIS — Z30.017 NEXPLANON INSERTION: ICD-10-CM

## 2020-01-22 DIAGNOSIS — Z23 NEED FOR PROPHYLACTIC VACCINATION AND INOCULATION AGAINST INFLUENZA: ICD-10-CM

## 2020-01-22 DIAGNOSIS — Z23 NEED FOR HPV VACCINE: ICD-10-CM

## 2020-01-22 PROCEDURE — 90686 IIV4 VACC NO PRSV 0.5 ML IM: CPT | Performed by: ADVANCED PRACTICE MIDWIFE

## 2020-01-22 PROCEDURE — 11982 REMOVE DRUG IMPLANT DEVICE: CPT | Performed by: ADVANCED PRACTICE MIDWIFE

## 2020-01-22 PROCEDURE — 90651 9VHPV VACCINE 2/3 DOSE IM: CPT | Performed by: ADVANCED PRACTICE MIDWIFE

## 2020-01-22 PROCEDURE — 90471 IMMUNIZATION ADMIN: CPT | Performed by: ADVANCED PRACTICE MIDWIFE

## 2020-01-22 PROCEDURE — 90472 IMMUNIZATION ADMIN EACH ADD: CPT | Performed by: ADVANCED PRACTICE MIDWIFE

## 2020-01-22 NOTE — PATIENT INSTRUCTIONS
- Call if fever, bleeding, severe pain, or foul smell occur.  - Due for Pap smear. 3rd and final HPV vaccine due in 12 weeks. Schedule annual physical with Pap at that time.

## 2020-01-22 NOTE — PROGRESS NOTES
"Nexplanon Removal:   INDICATIONS:                                                      Julia is here today for removal of Nexplanon contraceptive implant.     Is a pregnancy test required: No.  Was a consent obtained?  Yes      PROCEDURE:                                                      Patient was placed in dorsal supine position with left arm abducted and externally rotated. Nexplanon was palpated under skin. The area was cleansed with betadine. The distal site was injected with 1 ml of 1% plain lidocaine.  While pushing down on the proximal end, a small (<5 mm) incision was made over the distal implant with a scalpel. The implant was grasped with a mosquito forceps and removed intact. The skin was closed with steristrips x2. A pressure bandage was placed for the next 12-24 hours.  She experienced moderate discomfort and nausea during the procedure due to unease with \"needles and medical procedures\". Denied any pain throughout. There were otherwise no complications. Patient was discharged in stable condition.      Follow up: Pt was instructed to call if fever, bleeding, severe pain, or foul smell occur. Birth control counseling was offered. She is not interested in pursuing other methods at this time besides condoms. Handouts given.    Will give 2nd HPV vaccine and flu vaccine today. Pt still agreeable to this after Nexplanon removal and tolerated injections well.  Due for Pap - instructed to schedule annual exam in three months and complete HPV series at that time.     SASHA To CNM    "

## 2020-01-29 ASSESSMENT — ENCOUNTER SYMPTOMS
ARTHRALGIAS: 0
EYE PAIN: 0
NERVOUS/ANXIOUS: 1
COUGH: 0
SORE THROAT: 0
FREQUENCY: 0
HEARTBURN: 0
DIARRHEA: 0
DYSURIA: 0
JOINT SWELLING: 0
BREAST MASS: 0
HEADACHES: 0
HEMATURIA: 0
ABDOMINAL PAIN: 0
WEAKNESS: 0
CHILLS: 0
PALPITATIONS: 0
DIZZINESS: 0
PARESTHESIAS: 0
NAUSEA: 0
FEVER: 0
SHORTNESS OF BREATH: 0
CONSTIPATION: 0
HEMATOCHEZIA: 0
MYALGIAS: 0

## 2020-02-04 ENCOUNTER — OFFICE VISIT (OUTPATIENT)
Dept: INTERNAL MEDICINE | Facility: CLINIC | Age: 25
End: 2020-02-04
Payer: COMMERCIAL

## 2020-02-04 VITALS
WEIGHT: 117 LBS | DIASTOLIC BLOOD PRESSURE: 80 MMHG | HEART RATE: 80 BPM | HEIGHT: 68 IN | OXYGEN SATURATION: 100 % | TEMPERATURE: 98.4 F | BODY MASS INDEX: 17.73 KG/M2 | RESPIRATION RATE: 16 BRPM | SYSTOLIC BLOOD PRESSURE: 130 MMHG

## 2020-02-04 DIAGNOSIS — Z00.00 ROUTINE GENERAL MEDICAL EXAMINATION AT A HEALTH CARE FACILITY: Primary | ICD-10-CM

## 2020-02-04 DIAGNOSIS — H61.23 BILATERAL IMPACTED CERUMEN: ICD-10-CM

## 2020-02-04 PROCEDURE — 69209 REMOVE IMPACTED EAR WAX UNI: CPT | Mod: 50 | Performed by: NURSE PRACTITIONER

## 2020-02-04 PROCEDURE — 99395 PREV VISIT EST AGE 18-39: CPT | Mod: 25 | Performed by: NURSE PRACTITIONER

## 2020-02-04 ASSESSMENT — ENCOUNTER SYMPTOMS
FEVER: 0
MYALGIAS: 0
EYE PAIN: 0
ARTHRALGIAS: 0
ABDOMINAL PAIN: 0
COUGH: 0
HEARTBURN: 0
CHILLS: 0
HEMATURIA: 0
NERVOUS/ANXIOUS: 1
DIZZINESS: 0
WEAKNESS: 0
SHORTNESS OF BREATH: 0
HEADACHES: 0
PALPITATIONS: 0
PARESTHESIAS: 0
SORE THROAT: 0
HEMATOCHEZIA: 0
BREAST MASS: 0
FREQUENCY: 0
JOINT SWELLING: 0
CONSTIPATION: 0
DIARRHEA: 0
DYSURIA: 0
NAUSEA: 0

## 2020-02-04 ASSESSMENT — PATIENT HEALTH QUESTIONNAIRE - PHQ9: SUM OF ALL RESPONSES TO PHQ QUESTIONS 1-9: 7

## 2020-02-04 ASSESSMENT — MIFFLIN-ST. JEOR: SCORE: 1316.27

## 2020-02-04 NOTE — PROGRESS NOTES
SUBJECTIVE:   CC: Julia Rowe is an 24 year old woman who presents for preventive health visit.     Healthy Habits:     Getting at least 3 servings of Calcium per day:  Yes    Bi-annual eye exam:  Yes    Dental care twice a year:  Yes    Sleep apnea or symptoms of sleep apnea:  None    Diet:  Regular (no restrictions)    Frequency of exercise:  4-5 days/week    Duration of exercise:  Greater than 60 minutes    Taking medications regularly:  No    Barriers to taking medications:  Side effects    Medication side effects:  Other    PHQ-2 Total Score: 2    Additional concerns today:  No        Today's PHQ-2 Score:   PHQ-2 ( 1999 Pfizer) 1/29/2020   Q1: Little interest or pleasure in doing things 1   Q2: Feeling down, depressed or hopeless 1   PHQ-2 Score 2   Q1: Little interest or pleasure in doing things Several days   Q2: Feeling down, depressed or hopeless Several days   PHQ-2 Score 2       Abuse: Current or Past(Physical, Sexual or Emotional)- No  Do you feel safe in your environment? Yes        Social History     Tobacco Use     Smoking status: Never Smoker     Smokeless tobacco: Never Used   Substance Use Topics     Alcohol use: Yes     Comment: 1-2 per week         Alcohol Use 1/29/2020   Prescreen: >3 drinks/day or >7 drinks/week? No   Prescreen: >3 drinks/day or >7 drinks/week? -       Reviewed orders with patient.  Reviewed health maintenance and updated orders accordingly -           Pertinent mammograms are reviewed under the imaging tab.  History of abnormal Pap smear: NO - age 30- 65 PAP every 3 years recommended     Reviewed and updated as needed this visit by clinical staff  Tobacco  Allergies  Meds  Problems  Med Hx  Surg Hx  Fam Hx  Soc Hx          Reviewed and updated as needed this visit by Provider  Tobacco  Allergies  Meds  Problems  Med Hx  Surg Hx  Fam Hx            Review of Systems   Constitutional: Negative for chills and fever.   HENT: Negative for congestion, ear pain,  "hearing loss and sore throat.    Eyes: Negative for pain and visual disturbance.   Respiratory: Negative for cough and shortness of breath.    Cardiovascular: Negative for chest pain, palpitations and peripheral edema.   Gastrointestinal: Negative for abdominal pain, constipation, diarrhea, heartburn, hematochezia and nausea.   Breasts:  Negative for tenderness, breast mass and discharge.   Genitourinary: Negative for dysuria, frequency, genital sores, hematuria, pelvic pain, urgency, vaginal bleeding and vaginal discharge.   Musculoskeletal: Negative for arthralgias, joint swelling and myalgias.   Skin: Negative for rash.   Neurological: Negative for dizziness, weakness, headaches and paresthesias.   Psychiatric/Behavioral: Negative for mood changes. The patient is nervous/anxious.      Needs physical for playing tackle football        OBJECTIVE:   /80   Pulse 80   Temp 98.4  F (36.9  C) (Oral)   Resp 16   Ht 1.715 m (5' 7.5\")   Wt 53.1 kg (117 lb)   LMP 01/16/2020   SpO2 100%   BMI 18.05 kg/m    Physical Exam  GENERAL: alert and no distress  EYES: Eyes grossly normal to inspection,  and conjunctivae and sclerae normal  HENT: ear canals with cerumen bilaterally - ear wash done , nose and mouth without ulcers or lesions  NECK: no adenopathy, no asymmetry, masses, or scars and thyroid normal to palpation  RESP: lungs clear to auscultation - no rales, rhonchi or wheezes  BREAST: normal without masses, tenderness or nipple discharge and no palpable axillary masses or adenopathy  CV: regular rate and rhythm, normal S1 S2, no S3 or S4, no murmur, click or rub, no peripheral edema and peripheral pulses strong  ABDOMEN: soft, nontender, no hepatosplenomegaly, no masses and bowel sounds normal  MS: no gross musculoskeletal defects noted, no edema  SKIN: no suspicious lesions or rashes  NEURO: Normal strength and tone, mentation intact and speech normal  PSYCH: mentation appears normal, affect " "normal/bright    Diagnostic Test Results:  none     ASSESSMENT/PLAN:   1. Routine general medical examination at a health care facility      2. Bilateral impacted cerumen  Ear wash done       COUNSELING:  Reviewed preventive health counseling, as reflected in patient instructions       Regular exercise       Healthy diet/nutrition       Osteoporosis Prevention/Bone Health    Estimated body mass index is 18.05 kg/m  as calculated from the following:    Height as of this encounter: 1.715 m (5' 7.5\").    Weight as of this encounter: 53.1 kg (117 lb).         reports that she has never smoked. She has never used smokeless tobacco.      Counseling Resources:  ATP IV Guidelines  Pooled Cohorts Equation Calculator  Breast Cancer Risk Calculator  FRAX Risk Assessment  ICSI Preventive Guidelines  Dietary Guidelines for Americans, 2010  USDA's MyPlate  ASA Prophylaxis  Lung CA Screening    SASHA M[facility]  "

## 2020-03-11 ENCOUNTER — OFFICE VISIT (OUTPATIENT)
Dept: OBGYN | Facility: CLINIC | Age: 25
End: 2020-03-11
Payer: COMMERCIAL

## 2020-03-11 VITALS — WEIGHT: 119 LBS | DIASTOLIC BLOOD PRESSURE: 78 MMHG | BODY MASS INDEX: 18.36 KG/M2 | SYSTOLIC BLOOD PRESSURE: 128 MMHG

## 2020-03-11 DIAGNOSIS — Z12.4 SCREENING FOR CERVICAL CANCER: ICD-10-CM

## 2020-03-11 DIAGNOSIS — Z11.3 ROUTINE SCREENING FOR STI (SEXUALLY TRANSMITTED INFECTION): ICD-10-CM

## 2020-03-11 DIAGNOSIS — Z01.419 WELL WOMAN EXAM WITH ROUTINE GYNECOLOGICAL EXAM: Primary | ICD-10-CM

## 2020-03-11 PROCEDURE — 87591 N.GONORRHOEAE DNA AMP PROB: CPT | Performed by: ADVANCED PRACTICE MIDWIFE

## 2020-03-11 PROCEDURE — 87491 CHLMYD TRACH DNA AMP PROBE: CPT | Performed by: ADVANCED PRACTICE MIDWIFE

## 2020-03-11 PROCEDURE — G0145 SCR C/V CYTO,THINLAYER,RESCR: HCPCS | Performed by: ADVANCED PRACTICE MIDWIFE

## 2020-03-11 PROCEDURE — 99395 PREV VISIT EST AGE 18-39: CPT | Performed by: ADVANCED PRACTICE MIDWIFE

## 2020-03-11 NOTE — PROGRESS NOTES
Julia is a 24 year old  female who presents for gynecological exam with pap.     Besides routine health maintenance, she has no other health concerns today .  HPI:  Here for well-woman gynecological exam. Sees internal medicine for other routine healthcare needs.  Has had one menses since Nexplanon removed 2020. Normal flow and duration. Reports irregular periods prior to Nexplanon use.   Patient's last menstrual period was 2020 (exact date)..   Using condoms for contraception.  Does not desire other methods at this time.   She is not currently considering pregnancy.    REPRODUCTIVE/GYNECOLOGIC HISTORY:  Julia is sexually active with one male partner in a monogamous relationship.   STI testing offered?  Accepted  History of abnormal Pap smear:  No  SOCIAL HISTORY  Abuse: does not report having previously been physical or sexually abused.    Do you feel safe in your environment? YES     She  reports that she has never smoked. She has never used smokeless tobacco.    Last PHQ-9 score on record =   PHQ-9 SCORE 2020   PHQ-9 Total Score MyChart -   PHQ-9 Total Score 7     She has been off medications for depression for less than one year. Feels she is coping well. Has been using CBD oil and states good response/stable moods.    Alcohol Score = 0    HEALTH MAINTENANCE:  Cholesterol:  Cholesterol   Date Value Ref Range Status   2017 165 <200 mg/dL Final    History of abnormal lipids: No  Mammo: N/A . History of abnormal Mammo: Not applicable  Regular Self Breast Exams: No  TSH:   TSH   Date Value Ref Range Status   2017 2.68 0.40 - 4.00 mU/L Final      Pap: (No results found for: PAP ) never done before  Immunizations up to date: yes  (Gardasil, Tdap, Flu)  Health maintenance updated:  yes    HEALTHY HABITS  Eating habits: eats regular meals and follows a balanced nutrition diet  Calcium/Vitamin D intake: source:  dairy and MV Adequate? Yes  Exercise: How often do you exercise? Gym  5x/week; Football 3x/week Sheldon Saez  Have you had an eye exam in the last two years? YES    Do you routinely see the dentist once or twice yearly? YES       HISTORY:  OB History    Para Term  AB Living   0 0 0 0 0 0   SAB TAB Ectopic Multiple Live Births   0 0 0 0 0     Past Medical History:   Diagnosis Date     2006    hospitalized overnight     Past Surgical History:   Procedure Laterality Date     NO HISTORY OF SURGERY       Family History   Problem Relation Age of Onset     Diabetes Mother      Gastrointestinal Disease Father      Depression Brother      Social History     Socioeconomic History     Marital status:      Spouse name: Not on file     Number of children: Not on file     Years of education: Not on file     Highest education level: Not on file   Occupational History     Not on file   Social Needs     Financial resource strain: Not on file     Food insecurity     Worry: Not on file     Inability: Not on file     Transportation needs     Medical: Not on file     Non-medical: Not on file   Tobacco Use     Smoking status: Never Smoker     Smokeless tobacco: Never Used   Substance and Sexual Activity     Alcohol use: Yes     Comment: 1-2 per week     Drug use: No     Sexual activity: Yes     Partners: Male     Birth control/protection: Condom   Lifestyle     Physical activity     Days per week: Not on file     Minutes per session: Not on file     Stress: Not on file   Relationships     Social connections     Talks on phone: Not on file     Gets together: Not on file     Attends Nondenominational service: Not on file     Active member of club or organization: Not on file     Attends meetings of clubs or organizations: Not on file     Relationship status: Not on file     Intimate partner violence     Fear of current or ex partner: Not on file     Emotionally abused: Not on file     Physically abused: Not on file     Forced sexual activity: Not on file   Other Topics Concern      Parent/sibling w/ CABG, MI or angioplasty before 65F 55M? Not Asked   Social History Narrative     Not on file     No current outpatient medications on file.     Allergies   Allergen Reactions     Penicillins Rash     Wound Dressing Adhesive Dermatitis       Past medical, surgical, social and family history were reviewed and updated in [facility].    ROS:   CONSTITUTIONAL: NEGATIVE for fever, chills, change in weight  INTEGUMENTARU/SKIN: NEGATIVE for worrisome rashes, moles or lesions  EYES: NEGATIVE for vision changes or irritation  ENT: NEGATIVE for ear, mouth and throat problems  RESP: NEGATIVE for significant cough or SOB  BREAST: NEGATIVE for masses, tenderness or discharge  CV: NEGATIVE for chest pain, palpitations or peripheral edema  GI: NEGATIVE for nausea, abdominal pain, heartburn, or change in bowel habits  : NEGATIVE for unusual urinary or vaginal symptoms.  MUSCULOSKELETAL: NEGATIVE for significant arthralgias or myalgia  NEURO: NEGATIVE for weakness, dizziness or paresthesias  PSYCHIATRIC: NEGATIVE for changes in mood or affect    PHYSICAL EXAM:  /78 (BP Location: Right arm, Cuff Size: Adult Regular)   Wt 54 kg (119 lb)   LMP 02/27/2020 (Exact Date)   Breastfeeding No   BMI 18.36 kg/m     BMI: Body mass index is 18.36 kg/m .  Constitutional: Healthy, alert and no distress  Neck: Symmetrical, thyroid normal size, no masses present, no lymphadenopathy present.   Cardiovascular: RRR, no murmurs present  Respiratory: Breathing unlabored, lungs CTA bilaterally  Breast: Normal without masses, tenderness or nipple discharge and no palpable axillary masses or adenopathy  Gastrointestinal: Abdomen soft, non-tender, bowel sounds present  PELVIC EXAM:  Vulva: No lesions, no adenopathy, BUS WNL  Vagina: Moist, pink, discharge normal well rugated, no lesions  Cervix: Smooth, pink, ectropion noted, no visible lesions  Uterus: Normal size, non-tender, mobile  Ovaries: No masses palpated  Rectal exam:  Deferred    ASSESSMENT/PLAN:    ICD-10-CM    1. Well woman exam with routine gynecological exam  Z01.419    2. Routine screening for STI (sexually transmitted infection)  Z11.3 NEISSERIA GONORRHOEA PCR     CHLAMYDIA TRACHOMATIS PCR   3. Screening for cervical cancer  Z12.4 Pap imaged thin layer screen reflex to HPV if ASCUS - recommend age 25 - 29     No results found for any visits on 03/11/20.  Return to clinic 1 year for well woman exam.       COUNSELING:   Reviewed preventive health counseling, as reflected in patient instructions    Ni Bee, KIRAN, APRN, CNM

## 2020-03-11 NOTE — NURSING NOTE
"Chief Complaint   Patient presents with     Physical       Initial /78 (BP Location: Right arm, Cuff Size: Adult Regular)   Wt 54 kg (119 lb)   LMP 2020 (Exact Date)   Breastfeeding No   BMI 18.36 kg/m   Estimated body mass index is 18.36 kg/m  as calculated from the following:    Height as of 20: 1.715 m (5' 7.5\").    Weight as of this encounter: 54 kg (119 lb).  BP completed using cuff size: regular    Questioned patient about current smoking habits.  Pt. has never smoked.          The following HM Due: pap smear  Quang ()      Tristin Phillip CMA    "
NAD, appears comfortable
alert and oriented  accompanied by grandmother

## 2020-03-16 LAB
COPATH REPORT: NORMAL
PAP: NORMAL

## 2020-05-29 ENCOUNTER — TELEPHONE (OUTPATIENT)
Dept: INTERNAL MEDICINE | Facility: CLINIC | Age: 25
End: 2020-05-29

## 2020-05-29 ENCOUNTER — VIRTUAL VISIT (OUTPATIENT)
Dept: FAMILY MEDICINE | Facility: OTHER | Age: 25
End: 2020-05-29

## 2020-05-29 NOTE — TELEPHONE ENCOUNTER
Patient calling wanting to report a possible Covid symptom.  Patient stated she has been feeling sob and not able to get a satisfying breath.  Patient also stated her throat feels tight and like there is lots of mucous. Patient denied cough, fever.  Patient denies any exposures.      Call back and ok to leave detailed message.   [phone]

## 2020-05-29 NOTE — TELEPHONE ENCOUNTER
See message below.     Please advise. Not sure what to do at the end of the day?     Should she go to UC? [facility]?

## 2020-05-29 NOTE — PROGRESS NOTES
"Date: 2020 16:51:49  Clinician: True Salinas  Clinician NPI: [id number]  Patient: Julia Palencia  Patient : 1995  Patient Address: [address] [place] [address]  Patient Phone: [phone]  Visit Protocol: URI  Patient Summary:  Julia is a 24 year old ( : 1995 ) female who initiated a Visit for COVID-19 (Coronavirus) evaluation and screening. When asked the question \"Please sign me up to receive news, health information and promotions. \", Julia responded \"No\".    Julia states her symptoms started gradually 3-4 days ago.   Her symptoms consist of nasal congestion.   Symptom details   Nasal secretions: The color of her mucus is clear.   Julia denies having wheezing, nausea, teeth pain, ageusia, diarrhea, sore throat, malaise, myalgias, anosmia, facial pain or pressure, fever, cough, vomiting, rhinitis, ear pain, headache, and chills. She also denies having recent facial or sinus surgery in the past 60 days, double sickening (worsening symptoms after initial improvement), and taking antibiotic medication for the symptoms.    Pertinent COVID-19 (Coronavirus) information  In the past 14 days, Julia has not worked in a congregate living setting.   She does not work or volunteer as healthcare worker or a  and does not work or volunteer in a healthcare facility.   Julia also has not lived in a congregate living setting in the past 14 days. She does not live with a healthcare worker.   Julia has not had a close contact with a laboratory-confirmed COVID-19 patient within 14 days of symptom onset.   Triage Point(s) temporarily suspended for COVID-19 (Coronavirus) screening  Julia reported the following symptoms which were previously protocol referral points. These protocol referral points have temporarily been removed for purposes of COVID-19 (Coronavirus) screening.   Difficulty breathing even when resting and can only speak in phrase(s)   Pertinent medical history  Julia does not " get yeast infections when she takes antibiotics.   Julia does not need a return to work/school note.   Weight: 118 lbs   Julia does not smoke or use smokeless tobacco.   She denies pregnancy and denies breastfeeding. She has menstruated in the past month.   Additional information as reported by the patient (free text): feels like a tight/ full of crap throat. tried decongestants, but no help. not getting an effective deep breath feeling. still able to do normal day-to-day activities. just uncomfortable/annoying at this point.   Weight: 118 lbs    MEDICATIONS: No current medications, ALLERGIES: Penicillins  Clinician Response:  Dear Juila,      Your symptoms show that you may have coronavirus (COVID-19). This illness can cause fever, cough and trouble breathing. Many people get a mild case and get better on their own. Some people can get very sick.  Will I be tested for COVID-19?  Not all patients are tested for COVID-19. If you need to be tested, your care team will let you know. You may request testing if:   You are very ill. For example, you're on chemotherapy, dialysis or home hospice care. (Contact your specialty clinic or program.)   You live in a nursing home or other long-term care facility. (Talk to your nurse manager or medical director.)   You're a health care worker. (Contact your employee health office.)   We are performing limited curbside testing for healthcare/first responders and people with medical problems that put them at increased risk. It does not appear by the [facility] information you submitted that you meet any of these criteria. If there are medical problems that we did not know about, please repeat an [facility] visit and let us know what medical conditions you have.   How can I protect others?  Without a test, we can't know for sure that you have COVID-19. For safety, it's very important to follow these rules.  First, stay home and away from others (self-isolate) until:   You've had no  "fever---and no medicine that reduces fever---for 3 full days (72 hours). And...    Your other symptoms have gotten better. For example, your cough or breathing has improved. And...   At least 10 days have passed since your symptoms started.   During this time:   Stay in your own room (and use your own bathroom), if you can.   Stay away from others in your home. No hugging, kissing or shaking hands.   Don't let anyone visit.   Don't go to work, school or anywhere else.    Clean \"high touch\" surfaces often (doorknobs, counters, handles, etc.). Use a household cleaning spray or wipes.   Cover your mouth and nose with a mask, tissue or washcloth to avoid spreading germs.   Wash your hands and face often. Use soap and water.   How can I take care of myself?   1.Get lots of rest. Drink extra fluids (unless a doctor has told you not to).                  2.Take Tylenol (acetaminophen) for fever or pain. If you have liver or kidney problems, ask your family doctor if it's okay to take Tylenol.        Adults can take either:    650 mg (two 325 mg pills) every 4 to 6 hours, or...   1,000 mg (two 500 mg pills) every 8 hours as needed.    Note: Don't take more than 3,000 mg in one day. Acetaminophen is found in many medicines (both prescribed and over-the-counter medicines). Read all labels to be sure you don't take too much.    For children, check the Tylenol bottle for the right dose. The dose is based on the child's age or weight.   3.If you have other health problems (like cancer, heart failure, an organ transplant or severe kidney disease): Call your specialty clinic if you don't feel better in the next 2 days.       4.Know when to call 911: If your breathing is so bad that it keeps you from doing normal activities, call 911 or go to the emergency room. Tell them that you've been staying home and may have COVID-19.       5.Sign up for [facility]. We know it's scary to hear that you might have COVID-19. We want to track " your symptoms to make sure you're okay over the next 2 weeks. Please look for an email from [employer]---this is a free, online program that we'll use to keep in touch. To sign up, follow the link in the email. Learn more at [email or web address].   Where can I get more information?  For more about COVID-19 and caring for yourself at home, please visit the CDC website at https://www.cdc.gov/coronavirus/2019-ncov/about/steps-when-sick.html.  To learn about care at [facility], please visit [email or web address]         If you'd like to be part of a COVID-19 clinical trial (research study) at the [facility], go to [email or web address] for details.     Diagnosis: Nasal congestion  Diagnosis ICD: R09.81  Prescription: fluticasone propionate (Flonase Allergy Relief) 50 mcg/actuation nasal spray,suspension 1 60 spray aerosol with adapter, 0 days supply. Inhale 1 spray in each nostril intranasally 1 time per day as needed. Refills: 0, Refill as needed: no, Allow substitutions: yes  Pharmacy: [id number] - [phone] - [address] [phone]

## 2020-05-29 NOTE — TELEPHONE ENCOUNTER
Could start with [facility] if she wants and then they could directly refer to urgent care and testing   Or she could go to urgent care

## 2020-05-29 NOTE — TELEPHONE ENCOUNTER
Contacted patient, advised her that Yolanda recommends [facility] visit tonight, they can decide if testing is needed or direct her to Urgent Care appointment or she could go to Urgent Care. This RN also advised patient that if symptoms were severe, she should go to ER for evaluation. Patient states symptoms are not severe at this time. She will submit an [facility] visit.

## 2020-06-22 ENCOUNTER — VIRTUAL VISIT (OUTPATIENT)
Dept: FAMILY MEDICINE | Facility: OTHER | Age: 25
End: 2020-06-22

## 2020-06-22 NOTE — PROGRESS NOTES
"Date: 2020 13:12:25  Clinician: True Salinas  Clinician NPI: [id number]  Patient: Julia Rowe  Patient : 1995  Patient Address: [address] [place] [address]  Patient Phone: [phone]  Visit Protocol: URI  Patient Summary:  Julia is a 24 year old ( : 1995 ) female who initiated a Visit for cold, sinus infection, or influenza. When asked the question \"Please sign me up to receive news, health information and promotions. \", Julia responded \"No\".    Julia states her symptoms started gradually 2-3 weeks ago.   Her symptoms consist of a sore throat, myalgia, facial pain or pressure, nasal congestion, and a headache. She is experiencing mild difficulty breathing with activities but can speak normally in full sentences.   Symptom details     Nasal secretions: The color of her mucus is clear.    Sore throat: Julia reports having mild throat pain (1-3 on a 10 point pain scale), does not have exudate on her tonsils, and can swallow liquids. She is not sure if the lymph nodes in her neck are enlarged. A rash has not appeared on the skin since the sore throat started.     Facial pain or pressure: The facial pain or pressure feels worse when bending over or leaning forward.     Headache: She states the headache is moderate (4-6 on a 10 point pain scale).      Julia denies having wheezing, nausea, teeth pain, ageusia, diarrhea, malaise, anosmia, fever, cough, vomiting, rhinitis, ear pain, and chills. She also denies having recent facial or sinus surgery in the past 60 days, double sickening (worsening symptoms after initial improvement), and taking antibiotic medication for the symptoms.   Precipitating events  Within the past week, Julia has not been exposed to someone with strep throat. She has not recently been exposed to someone with influenza. Julia has not been in close contact with any high risk individuals.   Pertinent COVID-19 (Coronavirus) information  In the past 14 days, Julia has " not worked in a congregate living setting.   She does not work or volunteer as healthcare worker or a  and does not work or volunteer in a healthcare facility.   Julia also has not lived in a congregate living setting in the past 14 days. She does not live with a healthcare worker.   Julia has not had a close contact with a laboratory-confirmed COVID-19 patient within 14 days of symptom onset.   Pertinent medical history  Julia does not get yeast infections when she takes antibiotics.   Julia does not need a return to work/school note.   Weight: 120 lbs   Julia does not smoke or use smokeless tobacco.   She denies pregnancy and denies breastfeeding. She has menstruated in the past month.   Additional information as reported by the patient (free text): Nearly positive this is a sinus infection.  Have had them before, and am experiencing similar symptoms.   Weight: 120 lbs    MEDICATIONS: No current medications, ALLERGIES: Penicillins  Clinician Response:  Dear Julia,  Based on the information provided, you have acute bacterial sinusitis, also known as a sinus infection. Sinus infections are caused by bacteria or a virus and symptoms are almost always identical. The difference between the 2 types of infections is timing.  Sinus infections start as viral infections and symptoms improve on their own in about 7 days. If symptoms have not improved after 7 days or have even worsened, a bacterial infection may have developed.  Medication information  I am prescribing:     Azithromycin (Zithromax Z-Delfino) 250 mg oral tablet. Take 2 tablets by mouth on day 1, then 1 tablet by mouth on days 2-5. There are no refills with this prescription.   Yeast infections can be a common side effect of antibiotics. The most common symptom of a yeast infection is itchiness in and around the vagina. Other signs and symptoms include burning, redness, or a thick, white vaginal discharge that looks like cottage cheese and does not  have a bad smell.  If you become pregnant during this course of treatment, stop taking the medication and contact your primary care provider.  Self care  Steps you can take to be as comfortable as possible:     Rest.    Drink plenty of fluids.    Take a warm shower to loosen congestion    Use a cool-mist humidifier.    Use throat lozenges.    Suck on frozen items such as popsicles.    Drink hot tea with lemon and honey.    Gargle with warm salt water (1/4 teaspoon of salt per 8 ounce glass of water).     When to seek care  Please be seen in a clinic or urgent care if any of the following occur:     New symptoms develop, or symptoms become worse    Symptoms do not start to improve after 3 days of treatment     Call ahead before going to the clinic or urgent care.  It is possible to have an allergic reaction to an antibiotic even if you have not had one in the past. If you notice a new rash, significant swelling, or difficulty breathing, stop taking this medication immediately and go to a clinic or urgent care.  Call 911 or go to the emergency room if you feel that your throat is closing off, you suddenly develop a rash, you are unable to swallow fluids, you are drooling, or you are having difficulty breathing.  COVID-19 (Coronavirus) General Information  Because there is currently no vaccine to prevent infection, the best way to protect yourself is to avoid being exposed to this virus. Common symptoms of COVID-19 include but are not limited to fever, cough, and shortness of breath. These symptoms appear 2-14 days after you are exposed to the virus that causes COVID-19. Click here for more information from the CDC on how to protect yourself.  If you are sick with COVID-19 or suspect you are infected with the virus that causes COVID-19, follow the steps here from the CDC to help prevent the disease from spreading to people in your home and community.  Click here for general information from the CDC on testing.  If you  develop any of these emergency warning signs for COVID-19, get medical attention immediately:     Trouble breathing    Persistent pain or pressure in the chest    New confusion or inability to arouse    Bluish lips or face      Call your doctor or clinic before going in. Call 911 if you have a medical emergency and notify the  you have or think you may have COVID-19.  For more detailed and up to date information on COVID-19 (Coronavirus), please visit the CDC website.   Diagnosis: Acute bacterial sinusitis  Diagnosis ICD: J01.90  Prescription: azithromycin (Zithromax Z-Delfino) 250 mg oral tablet 6 tablet, 5 days supply. Take 2 tablets by mouth on day 1, then 1 tablet by mouth on days 2-5. Refills: 0, Refill as needed: no, Allow substitutions: yes  Pharmacy: [facility] #[id number] - [phone] - [address] [id number]

## 2020-07-28 ENCOUNTER — TRANSFERRED RECORDS (OUTPATIENT)
Dept: HEALTH INFORMATION MANAGEMENT | Facility: CLINIC | Age: 25
End: 2020-07-28

## 2021-01-15 ENCOUNTER — HEALTH MAINTENANCE LETTER (OUTPATIENT)
Age: 26
End: 2021-01-15

## 2021-03-21 ENCOUNTER — HEALTH MAINTENANCE LETTER (OUTPATIENT)
Age: 26
End: 2021-03-21

## 2021-09-05 ENCOUNTER — HEALTH MAINTENANCE LETTER (OUTPATIENT)
Age: 26
End: 2021-09-05

## 2021-09-24 PROCEDURE — 88302 TISSUE EXAM BY PATHOLOGIST: CPT | Mod: TC,ORL | Performed by: OBSTETRICS & GYNECOLOGY

## 2021-09-24 PROCEDURE — 88302 TISSUE EXAM BY PATHOLOGIST: CPT | Mod: 26 | Performed by: PATHOLOGY

## 2021-09-27 ENCOUNTER — LAB REQUISITION (OUTPATIENT)
Dept: LAB | Facility: CLINIC | Age: 26
End: 2021-09-27
Payer: COMMERCIAL

## 2021-09-29 LAB
PATH REPORT.COMMENTS IMP SPEC: NORMAL
PATH REPORT.COMMENTS IMP SPEC: NORMAL
PATH REPORT.FINAL DX SPEC: NORMAL
PATH REPORT.GROSS SPEC: NORMAL
PATH REPORT.MICROSCOPIC SPEC OTHER STN: NORMAL
PATH REPORT.RELEVANT HX SPEC: NORMAL
PHOTO IMAGE: NORMAL

## 2022-04-17 ENCOUNTER — HEALTH MAINTENANCE LETTER (OUTPATIENT)
Age: 27
End: 2022-04-17

## 2022-10-23 ENCOUNTER — HEALTH MAINTENANCE LETTER (OUTPATIENT)
Age: 27
End: 2022-10-23

## 2023-06-01 ENCOUNTER — HEALTH MAINTENANCE LETTER (OUTPATIENT)
Age: 28
End: 2023-06-01

## 2025-02-12 ENCOUNTER — LAB REQUISITION (OUTPATIENT)
Dept: LAB | Facility: CLINIC | Age: 30
End: 2025-02-12
Payer: COMMERCIAL

## 2025-02-12 DIAGNOSIS — Z12.4 ENCOUNTER FOR SCREENING FOR MALIGNANT NEOPLASM OF CERVIX: ICD-10-CM

## 2025-02-12 PROCEDURE — 87624 HPV HI-RISK TYP POOLED RSLT: CPT | Mod: ORL | Performed by: OBSTETRICS & GYNECOLOGY

## 2025-02-12 PROCEDURE — 87624 HPV HI-RISK TYP POOLED RSLT: CPT | Performed by: OBSTETRICS & GYNECOLOGY

## 2025-02-12 PROCEDURE — G0145 SCR C/V CYTO,THINLAYER,RESCR: HCPCS | Mod: ORL | Performed by: OBSTETRICS & GYNECOLOGY

## 2025-02-13 LAB
HPV HR 12 DNA CVX QL NAA+PROBE: NEGATIVE
HPV16 DNA CVX QL NAA+PROBE: NEGATIVE
HPV18 DNA CVX QL NAA+PROBE: NEGATIVE
HUMAN PAPILLOMA VIRUS FINAL DIAGNOSIS: NORMAL

## 2025-02-17 LAB
BKR AP ASSOCIATED HPV REPORT: NORMAL
BKR LAB AP GYN ADEQUACY: NORMAL
BKR LAB AP GYN INTERPRETATION: NORMAL
BKR LAB AP LMP: NORMAL
BKR LAB AP PREVIOUS ABNL DX: NORMAL
BKR LAB AP PREVIOUS ABNORMAL: NORMAL
PATH REPORT.COMMENTS IMP SPEC: NORMAL
PATH REPORT.COMMENTS IMP SPEC: NORMAL
PATH REPORT.RELEVANT HX SPEC: NORMAL